# Patient Record
Sex: MALE | Race: WHITE
[De-identification: names, ages, dates, MRNs, and addresses within clinical notes are randomized per-mention and may not be internally consistent; named-entity substitution may affect disease eponyms.]

---

## 2020-10-27 ENCOUNTER — HOSPITAL ENCOUNTER (OUTPATIENT)
Dept: HOSPITAL 22 - PT | Age: 65
Discharge: HOME | End: 2020-10-27
Payer: COMMERCIAL

## 2020-10-27 DIAGNOSIS — M54.41: Primary | ICD-10-CM

## 2020-10-27 PROCEDURE — 97163 PT EVAL HIGH COMPLEX 45 MIN: CPT

## 2020-10-27 PROCEDURE — 97012 MECHANICAL TRACTION THERAPY: CPT

## 2020-10-27 PROCEDURE — 97110 THERAPEUTIC EXERCISES: CPT

## 2020-10-27 PROCEDURE — 97014 ELECTRIC STIMULATION THERAPY: CPT

## 2020-10-27 PROCEDURE — 97010 HOT OR COLD PACKS THERAPY: CPT

## 2020-10-27 PROCEDURE — G0283 ELEC STIM OTHER THAN WOUND: HCPCS

## 2020-11-07 ENCOUNTER — HOSPITAL ENCOUNTER (EMERGENCY)
Age: 65
Discharge: ANOTHER HOSPITAL | End: 2020-11-07
Payer: COMMERCIAL

## 2020-11-07 VITALS
OXYGEN SATURATION: 98 % | DIASTOLIC BLOOD PRESSURE: 74 MMHG | TEMPERATURE: 98.9 F | RESPIRATION RATE: 16 BRPM | HEART RATE: 79 BPM | SYSTOLIC BLOOD PRESSURE: 145 MMHG

## 2020-11-07 VITALS
SYSTOLIC BLOOD PRESSURE: 151 MMHG | OXYGEN SATURATION: 97 % | RESPIRATION RATE: 16 BRPM | HEART RATE: 57 BPM | DIASTOLIC BLOOD PRESSURE: 66 MMHG

## 2020-11-07 VITALS
TEMPERATURE: 99.3 F | SYSTOLIC BLOOD PRESSURE: 194 MMHG | DIASTOLIC BLOOD PRESSURE: 83 MMHG | HEART RATE: 79 BPM | RESPIRATION RATE: 17 BRPM | OXYGEN SATURATION: 97 %

## 2020-11-07 VITALS
RESPIRATION RATE: 18 BRPM | DIASTOLIC BLOOD PRESSURE: 72 MMHG | HEART RATE: 67 BPM | SYSTOLIC BLOOD PRESSURE: 168 MMHG | OXYGEN SATURATION: 95 %

## 2020-11-07 VITALS
RESPIRATION RATE: 18 BRPM | OXYGEN SATURATION: 98 % | SYSTOLIC BLOOD PRESSURE: 155 MMHG | HEART RATE: 55 BPM | DIASTOLIC BLOOD PRESSURE: 60 MMHG

## 2020-11-07 VITALS
HEART RATE: 62 BPM | DIASTOLIC BLOOD PRESSURE: 68 MMHG | SYSTOLIC BLOOD PRESSURE: 153 MMHG | OXYGEN SATURATION: 98 % | RESPIRATION RATE: 18 BRPM

## 2020-11-07 VITALS
RESPIRATION RATE: 20 BRPM | SYSTOLIC BLOOD PRESSURE: 143 MMHG | DIASTOLIC BLOOD PRESSURE: 45 MMHG | OXYGEN SATURATION: 99 % | HEART RATE: 56 BPM

## 2020-11-07 VITALS — BODY MASS INDEX: 26.6 KG/M2

## 2020-11-07 DIAGNOSIS — M51.36: ICD-10-CM

## 2020-11-07 DIAGNOSIS — D49.2: Primary | ICD-10-CM

## 2020-11-07 DIAGNOSIS — M54.31: ICD-10-CM

## 2020-11-07 DIAGNOSIS — Z87.891: ICD-10-CM

## 2020-11-07 PROCEDURE — 96372 THER/PROPH/DIAG INJ SC/IM: CPT

## 2020-11-07 PROCEDURE — 93971 EXTREMITY STUDY: CPT

## 2020-11-07 PROCEDURE — 96376 TX/PRO/DX INJ SAME DRUG ADON: CPT

## 2020-11-07 PROCEDURE — 99284 EMERGENCY DEPT VISIT MOD MDM: CPT

## 2020-11-07 PROCEDURE — 96374 THER/PROPH/DIAG INJ IV PUSH: CPT

## 2020-11-07 PROCEDURE — 72131 CT LUMBAR SPINE W/O DYE: CPT

## 2020-11-07 PROCEDURE — 96367 TX/PROPH/DG ADDL SEQ IV INF: CPT

## 2021-06-08 ENCOUNTER — HOSPITAL ENCOUNTER (OUTPATIENT)
Age: 66
End: 2021-06-08
Payer: MEDICARE

## 2021-06-08 DIAGNOSIS — Z79.899: ICD-10-CM

## 2021-06-08 DIAGNOSIS — M06.00: Primary | ICD-10-CM

## 2021-06-08 LAB
ALBUMIN LEVEL: 4.3 G/DL (ref 3.5–5)
ALBUMIN/GLOB SERPL: 1.5 {RATIO} (ref 1.1–1.8)
ALP ISO SERPL-ACNC: 178 U/L (ref 38–126)
ALT SERPLBLD-CCNC: 22 U/L (ref 12–78)
ANION GAP SERPL CALC-SCNC: 15.4 MEQ/L (ref 5–15)
AST SERPL QL: 31 U/L (ref 17–59)
BILIRUBIN,TOTAL: 0.3 MG/DL (ref 0.2–1.3)
BUN SERPL-MCNC: 21 MG/DL (ref 9–20)
CALCIUM SPEC-MCNC: 9.5 MG/DL (ref 8.4–10.2)
CHLORIDE SPEC-SCNC: 101 MMOL/L (ref 98–107)
CO2 SERPL-SCNC: 25 MMOL/L (ref 22–30)
CREAT BLD-SCNC: 0.9 MG/DL (ref 0.66–1.25)
ESTIMATED GLOMERULAR FILT RATE: 85 ML/MIN (ref 60–?)
GFR (AFRICAN AMERICAN): 102 ML/MIN (ref 60–?)
GLOBULIN SER CALC-MCNC: 2.9 G/DL (ref 1.3–3.2)
GLUCOSE: 90 MG/DL (ref 74–100)
HCT VFR BLD CALC: 38.6 % (ref 42–52)
HGB BLD-MCNC: 12.9 G/DL (ref 14.1–18)
MCHC RBC-ENTMCNC: 33.3 G/DL (ref 31.8–35.4)
MCV RBC: 83.3 FL (ref 80–94)
MEAN CORPUSCULAR HEMOGLOBIN: 27.7 PG (ref 27–31.2)
PLATELET # BLD: 325 K/MM3 (ref 142–424)
POTASSIUM: 5.4 MMOL/L (ref 3.5–5.1)
PROT SERPL-MCNC: 7.2 G/DL (ref 6.3–8.2)
RBC # BLD AUTO: 4.64 M/MM3 (ref 4.6–6.2)
SODIUM SPEC-SCNC: 136 MMOL/L (ref 136–145)
WBC # BLD AUTO: 8.1 K/MM3 (ref 4.8–10.8)

## 2021-06-08 PROCEDURE — 36415 COLL VENOUS BLD VENIPUNCTURE: CPT

## 2021-06-08 PROCEDURE — 80053 COMPREHEN METABOLIC PANEL: CPT

## 2021-06-08 PROCEDURE — 85025 COMPLETE CBC W/AUTO DIFF WBC: CPT

## 2022-01-05 ENCOUNTER — HOSPITAL ENCOUNTER (OUTPATIENT)
Age: 67
End: 2022-01-05
Payer: MEDICARE

## 2022-01-05 DIAGNOSIS — Z79.899: ICD-10-CM

## 2022-01-05 DIAGNOSIS — M10.9: ICD-10-CM

## 2022-01-05 DIAGNOSIS — M15.9: ICD-10-CM

## 2022-01-05 DIAGNOSIS — M06.00: ICD-10-CM

## 2022-01-05 DIAGNOSIS — E78.5: ICD-10-CM

## 2022-01-05 DIAGNOSIS — Z00.00: Primary | ICD-10-CM

## 2022-01-05 LAB
ALBUMIN LEVEL: 4.3 G/DL (ref 3.5–5)
ALBUMIN/GLOB SERPL: 1.7 {RATIO} (ref 1.1–1.8)
ALP ISO SERPL-ACNC: 117 U/L (ref 38–126)
ALT SERPLBLD-CCNC: 23 U/L (ref 12–78)
ANION GAP SERPL CALC-SCNC: 9.9 MEQ/L (ref 5–15)
AST SERPL QL: 32 U/L (ref 17–59)
BILIRUBIN,TOTAL: 0.4 MG/DL (ref 0.2–1.3)
BUN SERPL-MCNC: 17 MG/DL (ref 9–20)
CALCIUM SPEC-MCNC: 9.5 MG/DL (ref 8.4–10.2)
CHLORIDE SPEC-SCNC: 99 MMOL/L (ref 98–107)
CHOLEST SPEC-SCNC: 167 MG/DL (ref 140–200)
CO2 SERPL-SCNC: 31 MMOL/L (ref 22–30)
CREAT BLD-SCNC: 0.9 MG/DL (ref 0.66–1.25)
CRP SERPL HS-MCNC: 2.8 MG/L (ref 0–4)
ESTIMATED GLOMERULAR FILT RATE: 85 ML/MIN (ref 60–?)
GFR (AFRICAN AMERICAN): 102 ML/MIN (ref 60–?)
GLOBULIN SER CALC-MCNC: 2.5 G/DL (ref 1.3–3.2)
GLUCOSE: 97 MG/DL (ref 74–100)
HCT VFR BLD CALC: 42.8 % (ref 42–52)
HDLC SERPL-MCNC: 39 MG/DL (ref 40–60)
HGB BLD-MCNC: 13.7 G/DL (ref 14.1–18)
MCHC RBC-ENTMCNC: 32.1 G/DL (ref 31.8–35.4)
MCV RBC: 91.8 FL (ref 80–94)
MEAN CORPUSCULAR HEMOGLOBIN: 29.5 PG (ref 27–31.2)
PLATELET # BLD: 331 K/MM3 (ref 142–424)
POTASSIUM: 4.9 MMOL/L (ref 3.5–5.1)
PROT SERPL-MCNC: 6.8 G/DL (ref 6.3–8.2)
RBC # BLD AUTO: 4.66 M/MM3 (ref 4.6–6.2)
SODIUM SPEC-SCNC: 135 MMOL/L (ref 136–145)
TRIGLYCERIDES: 174 MG/DL (ref 30–150)
URATE SERPL-SCNC: 6.6 MG/DL (ref 3.5–8.5)
WBC # BLD AUTO: 5.6 K/MM3 (ref 4.8–10.8)

## 2022-01-05 PROCEDURE — 84550 ASSAY OF BLOOD/URIC ACID: CPT

## 2022-01-05 PROCEDURE — 80061 LIPID PANEL: CPT

## 2022-01-05 PROCEDURE — 36415 COLL VENOUS BLD VENIPUNCTURE: CPT

## 2022-01-05 PROCEDURE — 80053 COMPREHEN METABOLIC PANEL: CPT

## 2022-01-05 PROCEDURE — 85651 RBC SED RATE NONAUTOMATED: CPT

## 2022-01-05 PROCEDURE — 85025 COMPLETE CBC W/AUTO DIFF WBC: CPT

## 2022-01-05 PROCEDURE — 86140 C-REACTIVE PROTEIN: CPT

## 2023-02-11 ENCOUNTER — HOSPITAL ENCOUNTER (OUTPATIENT)
Dept: HOSPITAL 22 - ER | Age: 68
Setting detail: OBSERVATION
LOS: 2 days | Discharge: HOME | End: 2023-02-13
Payer: MEDICARE

## 2023-02-11 VITALS
SYSTOLIC BLOOD PRESSURE: 128 MMHG | HEART RATE: 75 BPM | RESPIRATION RATE: 22 BRPM | OXYGEN SATURATION: 96 % | DIASTOLIC BLOOD PRESSURE: 51 MMHG

## 2023-02-11 VITALS
DIASTOLIC BLOOD PRESSURE: 65 MMHG | RESPIRATION RATE: 12 BRPM | SYSTOLIC BLOOD PRESSURE: 129 MMHG | OXYGEN SATURATION: 99 % | HEART RATE: 71 BPM

## 2023-02-11 VITALS
RESPIRATION RATE: 15 BRPM | OXYGEN SATURATION: 99 % | DIASTOLIC BLOOD PRESSURE: 63 MMHG | SYSTOLIC BLOOD PRESSURE: 155 MMHG | HEART RATE: 67 BPM

## 2023-02-11 VITALS
RESPIRATION RATE: 18 BRPM | OXYGEN SATURATION: 95 % | HEART RATE: 69 BPM | DIASTOLIC BLOOD PRESSURE: 65 MMHG | TEMPERATURE: 98.6 F | SYSTOLIC BLOOD PRESSURE: 148 MMHG

## 2023-02-11 VITALS
SYSTOLIC BLOOD PRESSURE: 146 MMHG | HEART RATE: 85 BPM | DIASTOLIC BLOOD PRESSURE: 75 MMHG | OXYGEN SATURATION: 99 % | TEMPERATURE: 98.24 F | RESPIRATION RATE: 18 BRPM

## 2023-02-11 VITALS
SYSTOLIC BLOOD PRESSURE: 142 MMHG | RESPIRATION RATE: 18 BRPM | DIASTOLIC BLOOD PRESSURE: 51 MMHG | OXYGEN SATURATION: 99 % | HEART RATE: 82 BPM

## 2023-02-11 VITALS
DIASTOLIC BLOOD PRESSURE: 59 MMHG | RESPIRATION RATE: 16 BRPM | OXYGEN SATURATION: 97 % | HEART RATE: 61 BPM | SYSTOLIC BLOOD PRESSURE: 135 MMHG

## 2023-02-11 VITALS
RESPIRATION RATE: 15 BRPM | TEMPERATURE: 97.9 F | SYSTOLIC BLOOD PRESSURE: 155 MMHG | DIASTOLIC BLOOD PRESSURE: 63 MMHG | OXYGEN SATURATION: 99 % | HEART RATE: 67 BPM

## 2023-02-11 VITALS
OXYGEN SATURATION: 98 % | RESPIRATION RATE: 18 BRPM | DIASTOLIC BLOOD PRESSURE: 56 MMHG | SYSTOLIC BLOOD PRESSURE: 147 MMHG | HEART RATE: 78 BPM

## 2023-02-11 VITALS
RESPIRATION RATE: 15 BRPM | HEART RATE: 75 BPM | DIASTOLIC BLOOD PRESSURE: 70 MMHG | OXYGEN SATURATION: 98 % | SYSTOLIC BLOOD PRESSURE: 150 MMHG

## 2023-02-11 VITALS
SYSTOLIC BLOOD PRESSURE: 123 MMHG | OXYGEN SATURATION: 97 % | DIASTOLIC BLOOD PRESSURE: 80 MMHG | RESPIRATION RATE: 18 BRPM

## 2023-02-11 VITALS
DIASTOLIC BLOOD PRESSURE: 54 MMHG | OXYGEN SATURATION: 99 % | SYSTOLIC BLOOD PRESSURE: 129 MMHG | RESPIRATION RATE: 12 BRPM | HEART RATE: 74 BPM

## 2023-02-11 VITALS
DIASTOLIC BLOOD PRESSURE: 60 MMHG | HEART RATE: 63 BPM | OXYGEN SATURATION: 96 % | SYSTOLIC BLOOD PRESSURE: 121 MMHG | RESPIRATION RATE: 17 BRPM

## 2023-02-11 VITALS
SYSTOLIC BLOOD PRESSURE: 129 MMHG | DIASTOLIC BLOOD PRESSURE: 65 MMHG | OXYGEN SATURATION: 96 % | TEMPERATURE: 98.2 F | HEART RATE: 84 BPM | RESPIRATION RATE: 18 BRPM

## 2023-02-11 VITALS
SYSTOLIC BLOOD PRESSURE: 124 MMHG | HEART RATE: 78 BPM | OXYGEN SATURATION: 99 % | DIASTOLIC BLOOD PRESSURE: 52 MMHG | RESPIRATION RATE: 15 BRPM

## 2023-02-11 VITALS — BODY MASS INDEX: 25 KG/M2 | BODY MASS INDEX: 27.4 KG/M2 | BODY MASS INDEX: 24.6 KG/M2

## 2023-02-11 VITALS
SYSTOLIC BLOOD PRESSURE: 133 MMHG | OXYGEN SATURATION: 99 % | RESPIRATION RATE: 21 BRPM | DIASTOLIC BLOOD PRESSURE: 52 MMHG | HEART RATE: 75 BPM

## 2023-02-11 VITALS
SYSTOLIC BLOOD PRESSURE: 144 MMHG | OXYGEN SATURATION: 98 % | DIASTOLIC BLOOD PRESSURE: 60 MMHG | TEMPERATURE: 97.8 F | RESPIRATION RATE: 18 BRPM | HEART RATE: 72 BPM

## 2023-02-11 VITALS
OXYGEN SATURATION: 98 % | RESPIRATION RATE: 14 BRPM | HEART RATE: 82 BPM | SYSTOLIC BLOOD PRESSURE: 142 MMHG | DIASTOLIC BLOOD PRESSURE: 57 MMHG

## 2023-02-11 DIAGNOSIS — R11.2: Primary | ICD-10-CM

## 2023-02-11 DIAGNOSIS — Z20.822: ICD-10-CM

## 2023-02-11 DIAGNOSIS — M54.9: ICD-10-CM

## 2023-02-11 DIAGNOSIS — Z79.899: ICD-10-CM

## 2023-02-11 DIAGNOSIS — M51.17: ICD-10-CM

## 2023-02-11 DIAGNOSIS — U07.1: ICD-10-CM

## 2023-02-11 DIAGNOSIS — M51.36: ICD-10-CM

## 2023-02-11 DIAGNOSIS — I10: ICD-10-CM

## 2023-02-11 LAB
ALBUMIN LEVEL: 4.7 G/DL (ref 3.5–5)
ALBUMIN/GLOB SERPL: 1.4 {RATIO} (ref 1.1–1.8)
ALP ISO SERPL-ACNC: 143 U/L (ref 38–126)
ALT SERPLBLD-CCNC: 29 U/L (ref 12–78)
ANION GAP SERPL CALC-SCNC: 15.2 MEQ/L (ref 5–15)
AST SERPL QL: 33 U/L (ref 17–59)
BILIRUBIN,TOTAL: 0.7 MG/DL (ref 0.2–1.3)
BUN SERPL-MCNC: 22 MG/DL (ref 9–20)
CALCIUM SPEC-MCNC: 9.9 MG/DL (ref 8.4–10.2)
CELLS COUNTED: 100
CHLORIDE SPEC-SCNC: 101 MMOL/L (ref 98–107)
CO2 SERPL-SCNC: 30 MMOL/L (ref 22–30)
CREAT BLD-SCNC: 1 MG/DL (ref 0.66–1.25)
CREATININE CLEARANCE ESTIMATED: 77 ML/MIN (ref 50–200)
ESTIMATED GLOMERULAR FILT RATE: 75 ML/MIN (ref 60–?)
GFR (AFRICAN AMERICAN): 90 ML/MIN (ref 60–?)
GLOBULIN SER CALC-MCNC: 3.3 G/DL (ref 1.3–3.2)
GLUCOSE: 175 MG/DL (ref 74–100)
HCT VFR BLD CALC: 46.5 % (ref 42–52)
HGB BLD-MCNC: 15.6 G/DL (ref 14.1–18)
MANUAL DIFFERENTIAL: (no result)
MCHC RBC-ENTMCNC: 33.6 G/DL (ref 31.8–35.4)
MCV RBC: 89.4 FL (ref 80–94)
MEAN CORPUSCULAR HEMOGLOBIN: 30 PG (ref 27–31.2)
PLATELET # BLD: 392 K/MM3 (ref 142–424)
POTASSIUM: 4.2 MMOL/L (ref 3.5–5.1)
PROT SERPL-MCNC: 8 G/DL (ref 6.3–8.2)
RBC # BLD AUTO: 5.2 M/MM3 (ref 4.6–6.2)
SODIUM SPEC-SCNC: 142 MMOL/L (ref 136–145)
WBC # BLD AUTO: 7.9 K/MM3 (ref 4.8–10.8)

## 2023-02-11 PROCEDURE — 99285 EMERGENCY DEPT VISIT HI MDM: CPT

## 2023-02-11 PROCEDURE — 80048 BASIC METABOLIC PNL TOTAL CA: CPT

## 2023-02-11 PROCEDURE — 36415 COLL VENOUS BLD VENIPUNCTURE: CPT

## 2023-02-11 PROCEDURE — U0005 INFEC AGEN DETEC AMPLI PROBE: HCPCS

## 2023-02-11 PROCEDURE — 85025 COMPLETE CBC W/AUTO DIFF WBC: CPT

## 2023-02-11 PROCEDURE — 83735 ASSAY OF MAGNESIUM: CPT

## 2023-02-11 PROCEDURE — 87205 SMEAR GRAM STAIN: CPT

## 2023-02-11 PROCEDURE — 87507 IADNA-DNA/RNA PROBE TQ 12-25: CPT

## 2023-02-11 PROCEDURE — C9803 HOPD COVID-19 SPEC COLLECT: HCPCS

## 2023-02-11 PROCEDURE — 85007 BL SMEAR W/DIFF WBC COUNT: CPT

## 2023-02-11 PROCEDURE — G0378 HOSPITAL OBSERVATION PER HR: HCPCS

## 2023-02-11 PROCEDURE — 80053 COMPREHEN METABOLIC PANEL: CPT

## 2023-02-11 PROCEDURE — U0003 INFECTIOUS AGENT DETECTION BY NUCLEIC ACID (DNA OR RNA); SEVERE ACUTE RESPIRATORY SYNDROME CORONAVIRUS 2 (SARS-COV-2) (CORONAVIRUS DISEASE [COVID-19]), AMPLIFIED PROBE TECHNIQUE, MAKING USE OF HIGH THROUGHPUT TECHNOLOGIES AS DESCRIBED BY CMS-2020-01-R: HCPCS

## 2023-02-12 VITALS
RESPIRATION RATE: 16 BRPM | HEART RATE: 67 BPM | DIASTOLIC BLOOD PRESSURE: 47 MMHG | OXYGEN SATURATION: 97 % | TEMPERATURE: 98.4 F | SYSTOLIC BLOOD PRESSURE: 153 MMHG

## 2023-02-12 VITALS
TEMPERATURE: 98.5 F | SYSTOLIC BLOOD PRESSURE: 138 MMHG | OXYGEN SATURATION: 95 % | HEART RATE: 70 BPM | RESPIRATION RATE: 17 BRPM | DIASTOLIC BLOOD PRESSURE: 53 MMHG

## 2023-02-12 VITALS
OXYGEN SATURATION: 98 % | SYSTOLIC BLOOD PRESSURE: 140 MMHG | DIASTOLIC BLOOD PRESSURE: 55 MMHG | HEART RATE: 77 BPM | TEMPERATURE: 98.5 F | RESPIRATION RATE: 16 BRPM

## 2023-02-12 VITALS
HEART RATE: 64 BPM | RESPIRATION RATE: 17 BRPM | TEMPERATURE: 98.4 F | OXYGEN SATURATION: 97 % | SYSTOLIC BLOOD PRESSURE: 140 MMHG | DIASTOLIC BLOOD PRESSURE: 55 MMHG

## 2023-02-12 VITALS
RESPIRATION RATE: 16 BRPM | DIASTOLIC BLOOD PRESSURE: 53 MMHG | SYSTOLIC BLOOD PRESSURE: 159 MMHG | OXYGEN SATURATION: 95 % | HEART RATE: 59 BPM | TEMPERATURE: 99.32 F

## 2023-02-12 VITALS
RESPIRATION RATE: 17 BRPM | HEART RATE: 65 BPM | TEMPERATURE: 98.42 F | SYSTOLIC BLOOD PRESSURE: 164 MMHG | DIASTOLIC BLOOD PRESSURE: 56 MMHG | OXYGEN SATURATION: 99 %

## 2023-02-12 VITALS
RESPIRATION RATE: 18 BRPM | HEART RATE: 68 BPM | SYSTOLIC BLOOD PRESSURE: 132 MMHG | OXYGEN SATURATION: 100 % | DIASTOLIC BLOOD PRESSURE: 58 MMHG | TEMPERATURE: 98.6 F

## 2023-02-12 LAB
ALBUMIN LEVEL: 3.6 G/DL (ref 3.5–5)
ALBUMIN/GLOB SERPL: 1.4 {RATIO} (ref 1.1–1.8)
ALP ISO SERPL-ACNC: 101 U/L (ref 38–126)
ALT SERPLBLD-CCNC: 20 U/L (ref 12–78)
ANION GAP SERPL CALC-SCNC: 10.8 MEQ/L (ref 5–15)
ANION GAP SERPL CALC-SCNC: 6.8 MEQ/L (ref 5–15)
AST SERPL QL: 30 U/L (ref 17–59)
BILIRUBIN,TOTAL: 0.3 MG/DL (ref 0.2–1.3)
BUN SERPL-MCNC: 20 MG/DL (ref 9–20)
BUN SERPL-MCNC: 20 MG/DL (ref 9–20)
CALCIUM SPEC-MCNC: 8.4 MG/DL (ref 8.4–10.2)
CALCIUM SPEC-MCNC: 8.5 MG/DL (ref 8.4–10.2)
CHLORIDE SPEC-SCNC: 106 MMOL/L (ref 98–107)
CHLORIDE SPEC-SCNC: 108 MMOL/L (ref 98–107)
CO2 SERPL-SCNC: 30 MMOL/L (ref 22–30)
CO2 SERPL-SCNC: 31 MMOL/L (ref 22–30)
CREAT BLD-SCNC: 0.9 MG/DL (ref 0.66–1.25)
CREAT BLD-SCNC: 0.9 MG/DL (ref 0.66–1.25)
CREATININE CLEARANCE ESTIMATED: 69 ML/MIN (ref 50–200)
CREATININE CLEARANCE ESTIMATED: 69 ML/MIN (ref 50–200)
ESTIMATED GLOMERULAR FILT RATE: 84 ML/MIN (ref 60–?)
ESTIMATED GLOMERULAR FILT RATE: 84 ML/MIN (ref 60–?)
GFR (AFRICAN AMERICAN): 102 ML/MIN (ref 60–?)
GFR (AFRICAN AMERICAN): 102 ML/MIN (ref 60–?)
GLOBULIN SER CALC-MCNC: 2.6 G/DL (ref 1.3–3.2)
GLUCOSE: 91 MG/DL (ref 74–100)
GLUCOSE: 94 MG/DL (ref 74–100)
MAGNESIUM: 1.7 MG/DL (ref 1.6–2.3)
POTASSIUM: 3.8 MMOL/L (ref 3.5–5.1)
POTASSIUM: 3.8 MMOL/L (ref 3.5–5.1)
PROT SERPL-MCNC: 6.2 G/DL (ref 6.3–8.2)
SODIUM SPEC-SCNC: 142 MMOL/L (ref 136–145)
SODIUM SPEC-SCNC: 143 MMOL/L (ref 136–145)

## 2023-02-13 VITALS
TEMPERATURE: 98.8 F | SYSTOLIC BLOOD PRESSURE: 142 MMHG | OXYGEN SATURATION: 97 % | DIASTOLIC BLOOD PRESSURE: 63 MMHG | HEART RATE: 85 BPM | RESPIRATION RATE: 16 BRPM

## 2023-02-13 VITALS
DIASTOLIC BLOOD PRESSURE: 56 MMHG | SYSTOLIC BLOOD PRESSURE: 131 MMHG | OXYGEN SATURATION: 98 % | RESPIRATION RATE: 19 BRPM | HEART RATE: 86 BPM

## 2023-02-13 LAB
ALBUMIN LEVEL: 3.3 G/DL (ref 3.5–5)
ALBUMIN/GLOB SERPL: 1.3 {RATIO} (ref 1.1–1.8)
ALP ISO SERPL-ACNC: 97 U/L (ref 38–126)
ALT SERPLBLD-CCNC: 18 U/L (ref 12–78)
ANION GAP SERPL CALC-SCNC: 8.5 MEQ/L (ref 5–15)
AST SERPL QL: 33 U/L (ref 17–59)
BILIRUBIN,TOTAL: 0.5 MG/DL (ref 0.2–1.3)
BUN SERPL-MCNC: 13 MG/DL (ref 9–20)
CALCIUM SPEC-MCNC: 8.5 MG/DL (ref 8.4–10.2)
CHLORIDE SPEC-SCNC: 104 MMOL/L (ref 98–107)
CO2 SERPL-SCNC: 27 MMOL/L (ref 22–30)
CREAT BLD-SCNC: 0.6 MG/DL (ref 0.66–1.25)
CREATININE CLEARANCE ESTIMATED: 70 ML/MIN (ref 50–200)
ESTIMATED GLOMERULAR FILT RATE: 135 ML/MIN (ref 60–?)
GFR (AFRICAN AMERICAN): 163 ML/MIN (ref 60–?)
GLOBULIN SER CALC-MCNC: 2.5 G/DL (ref 1.3–3.2)
GLUCOSE: 87 MG/DL (ref 74–100)
HCT VFR BLD CALC: 40 % (ref 42–52)
HGB BLD-MCNC: 13.3 G/DL (ref 14.1–18)
MCHC RBC-ENTMCNC: 33.2 G/DL (ref 31.8–35.4)
MCV RBC: 89 FL (ref 80–94)
MEAN CORPUSCULAR HEMOGLOBIN: 29.5 PG (ref 27–31.2)
PLATELET # BLD: 420 K/MM3 (ref 142–424)
POTASSIUM: 3.5 MMOL/L (ref 3.5–5.1)
PROT SERPL-MCNC: 5.8 G/DL (ref 6.3–8.2)
RBC # BLD AUTO: 4.49 M/MM3 (ref 4.6–6.2)
SODIUM SPEC-SCNC: 136 MMOL/L (ref 136–145)
WBC # BLD AUTO: 8.8 K/MM3 (ref 4.8–10.8)

## 2023-04-20 ENCOUNTER — HOSPITAL ENCOUNTER (OUTPATIENT)
Age: 68
End: 2023-04-20
Payer: MEDICARE

## 2023-04-20 DIAGNOSIS — M25.511: Primary | ICD-10-CM

## 2023-04-20 PROCEDURE — 73030 X-RAY EXAM OF SHOULDER: CPT

## 2023-12-20 ENCOUNTER — HOSPITAL ENCOUNTER (EMERGENCY)
Age: 68
Discharge: HOME | End: 2023-12-20
Payer: MEDICARE

## 2023-12-20 VITALS
TEMPERATURE: 98.5 F | DIASTOLIC BLOOD PRESSURE: 55 MMHG | HEART RATE: 69 BPM | RESPIRATION RATE: 17 BRPM | OXYGEN SATURATION: 99 % | SYSTOLIC BLOOD PRESSURE: 126 MMHG

## 2023-12-20 VITALS — DIASTOLIC BLOOD PRESSURE: 53 MMHG | SYSTOLIC BLOOD PRESSURE: 127 MMHG | OXYGEN SATURATION: 98 % | HEART RATE: 63 BPM

## 2023-12-20 VITALS
OXYGEN SATURATION: 99 % | DIASTOLIC BLOOD PRESSURE: 79 MMHG | TEMPERATURE: 98.5 F | SYSTOLIC BLOOD PRESSURE: 164 MMHG | RESPIRATION RATE: 18 BRPM | HEART RATE: 90 BPM

## 2023-12-20 VITALS — HEART RATE: 63 BPM | OXYGEN SATURATION: 98 % | DIASTOLIC BLOOD PRESSURE: 73 MMHG | SYSTOLIC BLOOD PRESSURE: 122 MMHG

## 2023-12-20 VITALS — BODY MASS INDEX: 27.4 KG/M2

## 2023-12-20 DIAGNOSIS — I48.91: Primary | ICD-10-CM

## 2023-12-20 DIAGNOSIS — I65.23: ICD-10-CM

## 2023-12-20 DIAGNOSIS — Z86.718: ICD-10-CM

## 2023-12-20 DIAGNOSIS — E78.5: ICD-10-CM

## 2023-12-20 DIAGNOSIS — R47.89: ICD-10-CM

## 2023-12-20 DIAGNOSIS — Z87.891: ICD-10-CM

## 2023-12-20 DIAGNOSIS — Z86.73: ICD-10-CM

## 2023-12-20 DIAGNOSIS — I10: ICD-10-CM

## 2023-12-20 LAB
ALBUMIN LEVEL: 4.6 G/DL (ref 3.5–5)
ALBUMIN/GLOB SERPL: 1.7 {RATIO} (ref 1.1–1.8)
ALP ISO SERPL-ACNC: 136 U/L (ref 38–126)
ALT SERPLBLD-CCNC: 31 U/L (ref 12–78)
ANION GAP SERPL CALC-SCNC: 13.9 MEQ/L (ref 5–15)
AST SERPL QL: 35 U/L (ref 17–59)
BILIRUBIN,TOTAL: 0.6 MG/DL (ref 0.2–1.3)
BUN SERPL-MCNC: 12 MG/DL (ref 9–20)
CALCIUM SPEC-MCNC: 9.5 MG/DL (ref 8.4–10.2)
CHLORIDE SPEC-SCNC: 100 MMOL/L (ref 98–107)
CO2 SERPL-SCNC: 29 MMOL/L (ref 22–30)
CREAT BLD-SCNC: 1 MG/DL (ref 0.66–1.25)
CREATININE CLEARANCE ESTIMATED: 74 ML/MIN (ref 50–200)
ESTIMATED GLOMERULAR FILT RATE: 75 ML/MIN (ref 60–?)
GFR (AFRICAN AMERICAN): 90 ML/MIN (ref 60–?)
GLOBULIN SER CALC-MCNC: 2.7 G/DL (ref 1.3–3.2)
GLUCOSE BLDC GLUCOMTR-MCNC: 125 MG/DL (ref 70–110)
GLUCOSE: 110 MG/DL (ref 74–100)
HCT VFR BLD CALC: 42.3 % (ref 42–52)
HGB BLD-MCNC: 14.3 G/DL (ref 14.1–18)
MCHC RBC-ENTMCNC: 33.8 G/DL (ref 31.8–35.4)
MCV RBC: 91.7 FL (ref 80–94)
MEAN CORPUSCULAR HEMOGLOBIN: 31.1 PG (ref 27–31.2)
PLATELET # BLD: 311 K/MM3 (ref 142–424)
POTASSIUM: 3.9 MMOL/L (ref 3.5–5.1)
PROT SERPL-MCNC: 7.3 G/DL (ref 6.3–8.2)
RBC # BLD AUTO: 4.61 M/MM3 (ref 4.6–6.2)
SODIUM SPEC-SCNC: 139 MMOL/L (ref 136–145)
WBC # BLD AUTO: 5.8 K/MM3 (ref 4.8–10.8)

## 2023-12-20 PROCEDURE — 80053 COMPREHEN METABOLIC PANEL: CPT

## 2023-12-20 PROCEDURE — 82962 GLUCOSE BLOOD TEST: CPT

## 2023-12-20 PROCEDURE — 70496 CT ANGIOGRAPHY HEAD: CPT

## 2023-12-20 PROCEDURE — 96360 HYDRATION IV INFUSION INIT: CPT

## 2023-12-20 PROCEDURE — 70498 CT ANGIOGRAPHY NECK: CPT

## 2023-12-20 PROCEDURE — 71045 X-RAY EXAM CHEST 1 VIEW: CPT

## 2023-12-20 PROCEDURE — 85025 COMPLETE CBC W/AUTO DIFF WBC: CPT

## 2023-12-20 PROCEDURE — 93005 ELECTROCARDIOGRAM TRACING: CPT

## 2023-12-20 PROCEDURE — 99285 EMERGENCY DEPT VISIT HI MDM: CPT

## 2023-12-20 PROCEDURE — 70450 CT HEAD/BRAIN W/O DYE: CPT

## 2024-01-02 ENCOUNTER — HOSPITAL ENCOUNTER (OUTPATIENT)
Age: 69
Discharge: HOME | End: 2024-01-02
Payer: MEDICARE

## 2024-01-02 VITALS
SYSTOLIC BLOOD PRESSURE: 98 MMHG | RESPIRATION RATE: 16 BRPM | OXYGEN SATURATION: 97 % | HEART RATE: 53 BPM | DIASTOLIC BLOOD PRESSURE: 53 MMHG

## 2024-01-02 VITALS
OXYGEN SATURATION: 95 % | RESPIRATION RATE: 15 BRPM | HEART RATE: 56 BPM | SYSTOLIC BLOOD PRESSURE: 89 MMHG | TEMPERATURE: 97.2 F | DIASTOLIC BLOOD PRESSURE: 43 MMHG

## 2024-01-02 VITALS
HEART RATE: 52 BPM | RESPIRATION RATE: 16 BRPM | OXYGEN SATURATION: 97 % | SYSTOLIC BLOOD PRESSURE: 98 MMHG | DIASTOLIC BLOOD PRESSURE: 40 MMHG

## 2024-01-02 VITALS
SYSTOLIC BLOOD PRESSURE: 98 MMHG | DIASTOLIC BLOOD PRESSURE: 50 MMHG | RESPIRATION RATE: 17 BRPM | HEART RATE: 61 BPM | OXYGEN SATURATION: 96 %

## 2024-01-02 VITALS
DIASTOLIC BLOOD PRESSURE: 58 MMHG | RESPIRATION RATE: 16 BRPM | TEMPERATURE: 97.16 F | SYSTOLIC BLOOD PRESSURE: 151 MMHG | HEART RATE: 69 BPM | OXYGEN SATURATION: 99 %

## 2024-01-02 VITALS
RESPIRATION RATE: 16 BRPM | SYSTOLIC BLOOD PRESSURE: 99 MMHG | HEART RATE: 56 BPM | OXYGEN SATURATION: 98 % | TEMPERATURE: 98 F | DIASTOLIC BLOOD PRESSURE: 54 MMHG

## 2024-01-02 VITALS — OXYGEN SATURATION: 99 %

## 2024-01-02 VITALS — BODY MASS INDEX: 27.4 KG/M2

## 2024-01-02 DIAGNOSIS — Z79.01: ICD-10-CM

## 2024-01-02 DIAGNOSIS — R94.31: ICD-10-CM

## 2024-01-02 DIAGNOSIS — Z79.899: ICD-10-CM

## 2024-01-02 DIAGNOSIS — I48.0: Primary | ICD-10-CM

## 2024-01-02 DIAGNOSIS — I65.22: ICD-10-CM

## 2024-01-02 LAB
ANION GAP SERPL CALC-SCNC: 8.5 MEQ/L (ref 5–15)
BUN SERPL-MCNC: 19 MG/DL (ref 9–20)
CALCIUM SPEC-MCNC: 9.1 MG/DL (ref 8.4–10.2)
CHLORIDE SPEC-SCNC: 104 MMOL/L (ref 98–107)
CO2 SERPL-SCNC: 28 MMOL/L (ref 22–30)
CREATININE CLEARANCE ESTIMATED: 67 ML/MIN (ref 50–200)
CREATININE,SERUM: 1.1 MG/DL (ref 0.66–1.25)
ESTIMATED GLOMERULAR FILT RATE: 67 ML/MIN (ref 60–?)
GFR (AFRICAN AMERICAN): 81 ML/MIN (ref 60–?)
GLUCOSE: 97 MG/DL (ref 74–100)
HCT VFR BLD CALC: 44.2 % (ref 42–52)
HGB BLD-MCNC: 14.5 G/DL (ref 14.1–18)
INR PPP: 1.07 (ref 0.9–1.1)
MCHC RBC-ENTMCNC: 32.8 G/DL (ref 31.8–35.4)
MCV RBC: 93.1 FL (ref 80–94)
MEAN CORPUSCULAR HEMOGLOBIN: 30.5 PG (ref 27–31.2)
PLATELET # BLD: 273 K/MM3 (ref 142–424)
POTASSIUM: 4.5 MMOL/L (ref 3.5–5.1)
PT BLD: 11.5 SECONDS (ref 10.1–12.5)
RBC # BLD AUTO: 4.75 M/MM3 (ref 4.6–6.2)
SODIUM SPEC-SCNC: 136 MMOL/L (ref 136–145)
WBC # BLD AUTO: 6.8 K/MM3 (ref 4.8–10.8)

## 2024-01-02 PROCEDURE — 92960 CARDIOVERSION ELECTRIC EXT: CPT

## 2024-01-02 PROCEDURE — 93270 REMOTE 30 DAY ECG REV/REPORT: CPT

## 2024-01-02 PROCEDURE — 93319 3D ECHO IMG CGEN CAR ANOMAL: CPT

## 2024-01-02 PROCEDURE — 85025 COMPLETE CBC W/AUTO DIFF WBC: CPT

## 2024-01-02 PROCEDURE — 80048 BASIC METABOLIC PNL TOTAL CA: CPT

## 2024-01-02 PROCEDURE — 85610 PROTHROMBIN TIME: CPT

## 2024-01-02 PROCEDURE — 93005 ELECTROCARDIOGRAM TRACING: CPT

## 2024-01-02 PROCEDURE — 93312 ECHO TRANSESOPHAGEAL: CPT

## 2024-01-03 ENCOUNTER — HOSPITAL ENCOUNTER (OUTPATIENT)
Dept: HOSPITAL 22 - RAD | Age: 69
End: 2024-01-03
Payer: MEDICARE

## 2024-01-03 DIAGNOSIS — I48.91: Primary | ICD-10-CM

## 2024-01-03 DIAGNOSIS — Z87.891: ICD-10-CM

## 2024-01-03 DIAGNOSIS — R94.31: ICD-10-CM

## 2024-01-03 DIAGNOSIS — I65.29: ICD-10-CM

## 2024-01-03 PROCEDURE — 93306 TTE W/DOPPLER COMPLETE: CPT

## 2024-01-05 ENCOUNTER — HOSPITAL ENCOUNTER (OUTPATIENT)
Dept: HOSPITAL 22 - RAD | Age: 69
End: 2024-01-05
Payer: MEDICARE

## 2024-01-05 DIAGNOSIS — R94.31: ICD-10-CM

## 2024-01-05 DIAGNOSIS — I48.91: Primary | ICD-10-CM

## 2024-01-05 DIAGNOSIS — I65.29: ICD-10-CM

## 2024-01-05 PROCEDURE — 93017 CV STRESS TEST TRACING ONLY: CPT

## 2024-01-05 PROCEDURE — 78452 HT MUSCLE IMAGE SPECT MULT: CPT

## 2024-01-05 PROCEDURE — 93018 CV STRESS TEST I&R ONLY: CPT

## 2024-01-05 PROCEDURE — A9502 TC99M TETROFOSMIN: HCPCS

## 2024-01-05 RX ADMIN — SODIUM CHLORIDE, PRESERVATIVE FREE 10 ML: 5 INJECTION INTRAVENOUS at 08:45

## 2024-01-31 ENCOUNTER — HOSPITAL ENCOUNTER (OUTPATIENT)
Dept: HOSPITAL 22 - RT | Age: 69
End: 2024-01-31
Payer: MEDICARE

## 2024-01-31 DIAGNOSIS — I48.91: ICD-10-CM

## 2024-01-31 DIAGNOSIS — I48.0: Primary | ICD-10-CM

## 2024-01-31 DIAGNOSIS — Z86.718: ICD-10-CM

## 2024-01-31 DIAGNOSIS — M79.604: ICD-10-CM

## 2024-01-31 PROCEDURE — 93971 EXTREMITY STUDY: CPT

## 2024-02-02 ENCOUNTER — HOSPITAL ENCOUNTER (OUTPATIENT)
Dept: HOSPITAL 22 - RT | Age: 69
End: 2024-02-02
Payer: MEDICARE

## 2024-02-02 DIAGNOSIS — I63.9: ICD-10-CM

## 2024-02-02 DIAGNOSIS — I48.91: ICD-10-CM

## 2024-02-02 DIAGNOSIS — R09.89: ICD-10-CM

## 2024-02-02 LAB
ALBUMIN LEVEL: 4.2 G/DL (ref 3.5–5)
ALBUMIN/GLOB SERPL: 1.8 {RATIO} (ref 1.1–1.8)
ALP ISO SERPL-ACNC: 119 U/L (ref 38–126)
ALT SERPLBLD-CCNC: 29 U/L (ref 12–78)
ANION GAP SERPL CALC-SCNC: 9 MEQ/L (ref 5–15)
AST SERPL QL: 35 U/L (ref 17–59)
BILIRUBIN,TOTAL: 0.5 MG/DL (ref 0.2–1.3)
BUN SERPL-MCNC: 16 MG/DL (ref 9–20)
CALCIUM SPEC-MCNC: 9.5 MG/DL (ref 8.4–10.2)
CHLORIDE SPEC-SCNC: 104 MMOL/L (ref 98–107)
CO2 SERPL-SCNC: 30 MMOL/L (ref 22–30)
CREATININE,SERUM: 0.9 MG/DL (ref 0.66–1.25)
ESTIMATED GLOMERULAR FILT RATE: 84 ML/MIN (ref 60–?)
GFR (AFRICAN AMERICAN): 102 ML/MIN (ref 60–?)
GLOBULIN SER CALC-MCNC: 2.4 G/DL (ref 1.3–3.2)
GLUCOSE: 94 MG/DL (ref 74–100)
POTASSIUM: 5 MMOL/L (ref 3.5–5.1)
PROT SERPL-MCNC: 6.6 G/DL (ref 6.3–8.2)
SODIUM SPEC-SCNC: 138 MMOL/L (ref 136–145)

## 2024-02-02 PROCEDURE — 80053 COMPREHEN METABOLIC PANEL: CPT

## 2024-02-02 PROCEDURE — 93880 EXTRACRANIAL BILAT STUDY: CPT

## 2024-02-02 PROCEDURE — 36415 COLL VENOUS BLD VENIPUNCTURE: CPT

## 2024-02-12 ENCOUNTER — HOSPITAL ENCOUNTER (OUTPATIENT)
Dept: HOSPITAL 22 - RAD | Age: 69
End: 2024-02-12
Payer: MEDICARE

## 2024-02-12 DIAGNOSIS — I63.9: Primary | ICD-10-CM

## 2024-04-30 ENCOUNTER — HOSPITAL ENCOUNTER (OUTPATIENT)
Dept: HOSPITAL 22 - RAD | Age: 69
Discharge: HOME | End: 2024-04-30
Payer: MEDICARE

## 2024-04-30 DIAGNOSIS — I63.9: ICD-10-CM

## 2024-04-30 DIAGNOSIS — R41.3: Primary | ICD-10-CM

## 2024-04-30 PROCEDURE — 70551 MRI BRAIN STEM W/O DYE: CPT

## 2024-06-10 ENCOUNTER — HOSPITAL ENCOUNTER (EMERGENCY)
Age: 69
Discharge: HOME | End: 2024-06-10
Payer: MEDICARE

## 2024-06-10 VITALS
OXYGEN SATURATION: 100 % | HEART RATE: 61 BPM | RESPIRATION RATE: 20 BRPM | TEMPERATURE: 98.1 F | DIASTOLIC BLOOD PRESSURE: 64 MMHG | SYSTOLIC BLOOD PRESSURE: 186 MMHG

## 2024-06-10 VITALS
OXYGEN SATURATION: 98 % | RESPIRATION RATE: 16 BRPM | TEMPERATURE: 98.1 F | SYSTOLIC BLOOD PRESSURE: 154 MMHG | HEART RATE: 88 BPM | DIASTOLIC BLOOD PRESSURE: 62 MMHG

## 2024-06-10 VITALS — BODY MASS INDEX: 26.6 KG/M2

## 2024-06-10 DIAGNOSIS — M79.641: ICD-10-CM

## 2024-06-10 DIAGNOSIS — M25.531: ICD-10-CM

## 2024-06-10 DIAGNOSIS — M25.511: Primary | ICD-10-CM

## 2024-06-10 DIAGNOSIS — M06.00: ICD-10-CM

## 2024-06-10 DIAGNOSIS — M19.011: ICD-10-CM

## 2024-06-10 PROCEDURE — 99283 EMERGENCY DEPT VISIT LOW MDM: CPT

## 2024-06-10 PROCEDURE — 73030 X-RAY EXAM OF SHOULDER: CPT

## 2024-06-13 ENCOUNTER — HOSPITAL ENCOUNTER (OUTPATIENT)
Dept: HOSPITAL 22 - RAD | Age: 69
Discharge: HOME | End: 2024-06-13
Payer: MEDICARE

## 2024-06-13 DIAGNOSIS — M25.511: Primary | ICD-10-CM

## 2024-06-13 PROCEDURE — 73221 MRI JOINT UPR EXTREM W/O DYE: CPT

## 2024-09-03 ENCOUNTER — HOSPITAL ENCOUNTER (OUTPATIENT)
Dept: GENERAL RADIOLOGY | Facility: HOSPITAL | Age: 69
Discharge: HOME | End: 2024-09-03
Payer: MEDICARE

## 2024-09-03 ENCOUNTER — PRE-ADMISSION TESTING (OUTPATIENT)
Dept: PREADMISSION TESTING | Facility: HOSPITAL | Age: 69
End: 2024-09-03
Payer: MEDICARE

## 2024-09-03 VITALS — BODY MASS INDEX: 27.79 KG/M2 | HEIGHT: 65 IN | WEIGHT: 166.78 LBS

## 2024-09-03 LAB
ANION GAP SERPL CALCULATED.3IONS-SCNC: 11 MMOL/L (ref 5–15)
BUN SERPL-MCNC: 20 MG/DL (ref 8–23)
BUN/CREAT SERPL: 20.8 (ref 7–25)
CALCIUM SPEC-SCNC: 10.2 MG/DL (ref 8.6–10.5)
CHLORIDE SERPL-SCNC: 101 MMOL/L (ref 98–107)
CO2 SERPL-SCNC: 26 MMOL/L (ref 22–29)
CREAT SERPL-MCNC: 0.96 MG/DL (ref 0.76–1.27)
DEPRECATED RDW RBC AUTO: 42.8 FL (ref 37–54)
EGFRCR SERPLBLD CKD-EPI 2021: 86.1 ML/MIN/1.73
ERYTHROCYTE [DISTWIDTH] IN BLOOD BY AUTOMATED COUNT: 13 % (ref 12.3–15.4)
GLUCOSE SERPL-MCNC: 90 MG/DL (ref 65–99)
HBA1C MFR BLD: 5.1 % (ref 4.8–5.6)
HCT VFR BLD AUTO: 39.8 % (ref 37.5–51)
HGB BLD-MCNC: 13 G/DL (ref 13–17.7)
INR PPP: 1.1 (ref 0.89–1.12)
MCH RBC QN AUTO: 29.5 PG (ref 26.6–33)
MCHC RBC AUTO-ENTMCNC: 32.7 G/DL (ref 31.5–35.7)
MCV RBC AUTO: 90.5 FL (ref 79–97)
PLATELET # BLD AUTO: 378 10*3/MM3 (ref 140–450)
PMV BLD AUTO: 8.7 FL (ref 6–12)
POTASSIUM SERPL-SCNC: 4.9 MMOL/L (ref 3.5–5.2)
PROTHROMBIN TIME: 14.4 SECONDS (ref 12.2–14.5)
RBC # BLD AUTO: 4.4 10*6/MM3 (ref 4.14–5.8)
SODIUM SERPL-SCNC: 138 MMOL/L (ref 136–145)
WBC NRBC COR # BLD AUTO: 6.35 10*3/MM3 (ref 3.4–10.8)

## 2024-09-03 PROCEDURE — 36415 COLL VENOUS BLD VENIPUNCTURE: CPT

## 2024-09-03 PROCEDURE — 83036 HEMOGLOBIN GLYCOSYLATED A1C: CPT

## 2024-09-03 PROCEDURE — 80048 BASIC METABOLIC PNL TOTAL CA: CPT

## 2024-09-03 PROCEDURE — 85610 PROTHROMBIN TIME: CPT

## 2024-09-03 PROCEDURE — 71046 X-RAY EXAM CHEST 2 VIEWS: CPT

## 2024-09-03 PROCEDURE — 85027 COMPLETE CBC AUTOMATED: CPT

## 2024-09-03 RX ORDER — APIXABAN 5 MG/1
1 TABLET, FILM COATED ORAL 2 TIMES DAILY
Status: ON HOLD | COMMUNITY
Start: 2024-03-26

## 2024-09-03 RX ORDER — HYDROCODONE BITARTRATE AND ACETAMINOPHEN 5; 325 MG/1; MG/1
2 TABLET ORAL EVERY 6 HOURS PRN
Status: ON HOLD | COMMUNITY
Start: 2024-03-19

## 2024-09-03 RX ORDER — AMLODIPINE BESYLATE 5 MG/1
1 TABLET ORAL DAILY
Status: ON HOLD | COMMUNITY

## 2024-09-03 RX ORDER — LANOLIN ALCOHOL/MO/W.PET/CERES
50 CREAM (GRAM) TOPICAL DAILY
Status: ON HOLD | COMMUNITY

## 2024-09-03 RX ORDER — CHLORAL HYDRATE 500 MG
2000 CAPSULE ORAL
Status: ON HOLD | COMMUNITY

## 2024-09-03 RX ORDER — FAMOTIDINE 10 MG
20 TABLET ORAL DAILY PRN
Status: ON HOLD | COMMUNITY

## 2024-09-03 RX ORDER — BISOPROLOL FUMARATE 5 MG/1
1 TABLET, FILM COATED ORAL DAILY
Status: ON HOLD | COMMUNITY
Start: 2024-07-24

## 2024-09-03 RX ORDER — FOLIC ACID 1 MG/1
1 TABLET ORAL DAILY
Status: ON HOLD | COMMUNITY

## 2024-09-03 RX ORDER — ATORVASTATIN CALCIUM 80 MG/1
0.5 TABLET, FILM COATED ORAL DAILY
Status: ON HOLD | COMMUNITY

## 2024-09-03 RX ORDER — LOSARTAN POTASSIUM 100 MG/1
1 TABLET ORAL DAILY
Status: ON HOLD | COMMUNITY
Start: 2024-07-29

## 2024-09-03 RX ORDER — GABAPENTIN 300 MG/1
300 CAPSULE ORAL EVERY EVENING
Status: ON HOLD | COMMUNITY

## 2024-09-03 RX ORDER — LORATADINE 10 MG/1
10 CAPSULE, LIQUID FILLED ORAL DAILY
Status: ON HOLD | COMMUNITY

## 2024-09-03 NOTE — DISCHARGE INSTRUCTIONS
The following information and instructions were given:    Do not eat, drink, smoke or chew gum after midnight the night before surgery. This includes no mints.  Take all routine, prescribed medications including heart and blood pressure medicines with a sip of water unless otherwise instructed by your physician.   Do NOT take diabetic medication unless instructed by your physician.      DO NOT shave for two days before your procedure.  Do not wear makeup.      DO NOT wear fingernail polish (gel/regular) and/or acrylic/artificial nails on the day of surgery. If you had a recent manicure and would rather not remove polish or artificial nails, the minimum requirement is that the polish/artificial nails must be removed from the middle finger on each hand.      If you are having surgery/procedure on an upper extremity, fingernail polish/artificial fingernails must be removed for surgery.  NO EXCEPTIONS.      If you are having surgery/procedure on a lower extremity, toenail polish on both extremities must be removed for surgery.  NO EXCEPTIONS.    Remove all jewelry (advise to go to jeweler if unable to remove).  Jewelry, especially rings, can no longer be taped for surgery.    Leave anything you consider valuable at home.    Leave your suitcase in the car until after your surgery if you are staying overnight.    Bring the following with you the day of your procedure (when applicable):       -Picture ID and insurance cards       -Co-pay/deductible required by insurance       -Medications in the original bottles or a detailed list (name, dosage, frequency of medications) including all over-the-counter medications if not brought to PAT       -Copy of advance directive, living will or power of  documents if not brought to PAT       -CPAP or BIPAP mask and tubing (do not bring machine)       -Skin prep instruction(s) sheet       -PAT Pass    Educational handout or binder (joint replacements) related to procedure given  to patient.  Educational handout also includes general information related to the recovery that mentions signs and symptoms of infection and when to call the doctor.    When applicable, an ERAS handout was given to patient.    Respirex use and pneumonia prevention education provided in Pre Admission Testing general education video.    Information related to infection and hand hygiene mentioned in Pre Admission Testing general education video. Patient instructed to call their doctor if any of the following symptoms are noted during recovery:  Fever of 100.4 F or higher, incision that is warm or has increasing bleeding, redness or drainage.    DVT Prevention instructions given in general education video presentation during Pre Admission Testing appointment that stress the importance of ambulation to improve blood circulation.  Also encouraged patient to perform foot exercises when in bed and application of a sequential device may be applied to lower extremities to improve circulation.      Please apply Benzoyl Peroxide 5% wash to surgical area and document date/time of applications on skin prep instruction sheet.

## 2024-09-03 NOTE — PAT
An arrival time for procedure was not provided during PAT visit. If patient had any questions or concerns about their arrival time, they were instructed to contact their surgeon/physician.  Additionally, if the patient referred to an arrival time that was acquired from their my chart account, patient was encouraged to verify that time with their surgeon/physician. Arrival times are NOT provided in Pre Admission Testing Department.     Patient's surgeon called in a prescription for Benzol Peroxide 5% wash to [facility].  Patient instructed to  from [facility] that was submitted electronically.  Verbal and written instructions given regarding proper use of the Benzoyl Peroxide wash were provided to patient and/or famlily during PAT visit. Patient/family also instructed to complete Benzol Peroxide checklist and return it to Pre-op on the day of surgery.  Patient and/or family verbalized understanding.      Additionally, reinforced with patient to acquire this prescription from the [facility] retail pharmacy before leaving the hospital after PAT visit due to the potential unavailability at local pharmacies.    Patient instructed to drink 20 ounces of Gatorade or Gatorlyte (if diabetic) and it needs to be completed 1 hour (for Main OR patients) or 2 hours (scheduled  section & [facility] patients) before given arrival time for procedure (NO RED Gatorade and NO Gatorade Zero).    Patient verbalized understanding.    Per Anesthesia Request, patient instructed not to take their ACE/ARB medications on the AM of surgery.    Patient directed to Radiology Department for CXR after Pre Admission Testing Appointment.      Cardiac clearance and EKG in chart, and patient stated he was aware to hold Eliquis prior to surgery.

## 2024-09-15 ENCOUNTER — ANESTHESIA EVENT (OUTPATIENT)
Dept: PERIOP | Facility: HOSPITAL | Age: 69
End: 2024-09-15
Payer: MEDICARE

## 2024-09-15 RX ORDER — SODIUM CHLORIDE 9 MG/ML
40 INJECTION, SOLUTION INTRAVENOUS AS NEEDED
Status: CANCELLED | OUTPATIENT
Start: 2024-09-15

## 2024-09-15 RX ORDER — SODIUM CHLORIDE 0.9 % (FLUSH) 0.9 %
10 SYRINGE (ML) INJECTION EVERY 12 HOURS SCHEDULED
Status: CANCELLED | OUTPATIENT
Start: 2024-09-15

## 2024-09-15 RX ORDER — SODIUM CHLORIDE 0.9 % (FLUSH) 0.9 %
10 SYRINGE (ML) INJECTION AS NEEDED
Status: CANCELLED | OUTPATIENT
Start: 2024-09-15

## 2024-09-15 RX ORDER — FAMOTIDINE 10 MG/ML
20 INJECTION, SOLUTION INTRAVENOUS ONCE
Status: CANCELLED | OUTPATIENT
Start: 2024-09-15 | End: 2024-09-15

## 2024-09-16 ENCOUNTER — ANESTHESIA EVENT CONVERTED (OUTPATIENT)
Dept: ANESTHESIOLOGY | Facility: HOSPITAL | Age: 69
End: 2024-09-16
Payer: MEDICARE

## 2024-09-16 ENCOUNTER — ANESTHESIA (OUTPATIENT)
Dept: PERIOP | Facility: HOSPITAL | Age: 69
End: 2024-09-16
Payer: MEDICARE

## 2024-09-16 ENCOUNTER — APPOINTMENT (OUTPATIENT)
Dept: GENERAL RADIOLOGY | Facility: HOSPITAL | Age: 69
End: 2024-09-16
Payer: MEDICARE

## 2024-09-16 ENCOUNTER — HOSPITAL ENCOUNTER (OUTPATIENT)
Facility: HOSPITAL | Age: 69
Discharge: HOME | End: 2024-09-17
Attending: ORTHOPAEDIC SURGERY | Admitting: ORTHOPAEDIC SURGERY
Payer: MEDICARE

## 2024-09-16 PROBLEM — Z96.611 STATUS POST REVERSE TOTAL REPLACEMENT OF RIGHT SHOULDER: Status: ACTIVE | Noted: 2024-09-16

## 2024-09-16 PROBLEM — I48.91 ATRIAL FIBRILLATION: Status: ACTIVE | Noted: 2024-09-16

## 2024-09-16 PROBLEM — E78.5 HYPERLIPIDEMIA: Status: ACTIVE | Noted: 2024-09-16

## 2024-09-16 PROBLEM — Z79.01 CHRONIC ANTICOAGULATION: Status: ACTIVE | Noted: 2024-09-16

## 2024-09-16 PROBLEM — I10 HTN (HYPERTENSION): Status: ACTIVE | Noted: 2024-09-16

## 2024-09-16 LAB
GLUCOSE BLDC GLUCOMTR-MCNC: 88 MG/DL (ref 70–130)
POTASSIUM SERPL-SCNC: 4.5 MMOL/L (ref 3.5–5.2)

## 2024-09-16 PROCEDURE — A9270 NON-COVERED ITEM OR SERVICE: HCPCS | Performed by: ORTHOPAEDIC SURGERY

## 2024-09-16 PROCEDURE — 63710000001 CETIRIZINE 10 MG TABLET: Performed by: INTERNAL MEDICINE

## 2024-09-16 PROCEDURE — 82948 REAGENT STRIP/BLOOD GLUCOSE: CPT

## 2024-09-16 PROCEDURE — 25010000002 CEFAZOLIN PER 500 MG: Performed by: ORTHOPAEDIC SURGERY

## 2024-09-16 PROCEDURE — C1776 JOINT DEVICE (IMPLANTABLE): HCPCS | Performed by: ORTHOPAEDIC SURGERY

## 2024-09-16 PROCEDURE — 97110 THERAPEUTIC EXERCISES: CPT | Performed by: OCCUPATIONAL THERAPIST

## 2024-09-16 PROCEDURE — 25010000002 ONDANSETRON PER 1 MG: Performed by: ANESTHESIOLOGY

## 2024-09-16 PROCEDURE — 25010000002 PHENYLEPHRINE 10 MG/ML SOLUTION 1 ML VIAL

## 2024-09-16 PROCEDURE — 25010000002 ROPIVACAINE HCL-NACL 0.2-0.9 % SOLUTION: Performed by: NURSE ANESTHETIST, CERTIFIED REGISTERED

## 2024-09-16 PROCEDURE — 84132 ASSAY OF SERUM POTASSIUM: CPT | Performed by: ORTHOPAEDIC SURGERY

## 2024-09-16 PROCEDURE — 25010000002 DEXAMETHASONE PER 1 MG

## 2024-09-16 PROCEDURE — 25810000003 LACTATED RINGERS PER 1000 ML: Performed by: ANESTHESIOLOGY

## 2024-09-16 PROCEDURE — A9270 NON-COVERED ITEM OR SERVICE: HCPCS | Performed by: INTERNAL MEDICINE

## 2024-09-16 PROCEDURE — 25010000002 BUPIVACAINE (PF) 0.25 % SOLUTION: Performed by: NURSE ANESTHETIST, CERTIFIED REGISTERED

## 2024-09-16 PROCEDURE — C1713 ANCHOR/SCREW BN/BN,TIS/BN: HCPCS | Performed by: ORTHOPAEDIC SURGERY

## 2024-09-16 PROCEDURE — 25010000002 PROPOFOL 10 MG/ML EMULSION

## 2024-09-16 PROCEDURE — 25010000002 VANCOMYCIN HCL 1.25 G RECONSTITUTED SOLUTION 1 EACH VIAL: Performed by: ORTHOPAEDIC SURGERY

## 2024-09-16 PROCEDURE — G0378 HOSPITAL OBSERVATION PER HR: HCPCS

## 2024-09-16 PROCEDURE — 63710000001 AMLODIPINE 5 MG TABLET: Performed by: INTERNAL MEDICINE

## 2024-09-16 PROCEDURE — 97550 CAREGIVER TRAING 1ST 30 MIN: CPT | Performed by: OCCUPATIONAL THERAPIST

## 2024-09-16 PROCEDURE — 63710000001 OXYCODONE 5 MG TABLET: Performed by: ORTHOPAEDIC SURGERY

## 2024-09-16 PROCEDURE — 25010000002 ESMOLOL 100 MG/10ML SOLUTION

## 2024-09-16 PROCEDURE — 97530 THERAPEUTIC ACTIVITIES: CPT | Performed by: OCCUPATIONAL THERAPIST

## 2024-09-16 PROCEDURE — 25810000003 SODIUM CHLORIDE 0.9 % SOLUTION 250 ML FLEX CONT

## 2024-09-16 PROCEDURE — 97535 SELF CARE MNGMENT TRAINING: CPT | Performed by: OCCUPATIONAL THERAPIST

## 2024-09-16 PROCEDURE — L3670 SO ACRO/CLAV CAN WEB PRE OTS: HCPCS | Performed by: ORTHOPAEDIC SURGERY

## 2024-09-16 PROCEDURE — 73030 X-RAY EXAM OF SHOULDER: CPT

## 2024-09-16 PROCEDURE — 25010000002 FENTANYL CITRATE (PF) 50 MCG/ML SOLUTION: Performed by: NURSE ANESTHETIST, CERTIFIED REGISTERED

## 2024-09-16 PROCEDURE — 63710000001 GABAPENTIN 300 MG CAPSULE: Performed by: INTERNAL MEDICINE

## 2024-09-16 PROCEDURE — 63710000001 ACETAMINOPHEN 325 MG TABLET: Performed by: ORTHOPAEDIC SURGERY

## 2024-09-16 PROCEDURE — 25010000002 SUGAMMADEX 200 MG/2ML SOLUTION: Performed by: ANESTHESIOLOGY

## 2024-09-16 PROCEDURE — 63710000001 LOSARTAN 50 MG TABLET: Performed by: INTERNAL MEDICINE

## 2024-09-16 PROCEDURE — 63710000001 ATORVASTATIN 40 MG TABLET: Performed by: INTERNAL MEDICINE

## 2024-09-16 PROCEDURE — 97165 OT EVAL LOW COMPLEX 30 MIN: CPT | Performed by: OCCUPATIONAL THERAPIST

## 2024-09-16 PROCEDURE — 25810000003 SODIUM CHLORIDE 0.9 % SOLUTION 250 ML FLEX CONT: Performed by: ORTHOPAEDIC SURGERY

## 2024-09-16 DEVICE — IMPLANTABLE DEVICE
Type: IMPLANTABLE DEVICE | Site: SHOULDER | Status: FUNCTIONAL
Brand: EQUINOXE

## 2024-09-16 DEVICE — IMPLANTABLE DEVICE: Type: IMPLANTABLE DEVICE | Site: SHOULDER | Status: FUNCTIONAL

## 2024-09-16 DEVICE — HUMERAL LINER, CONSTRAINED
Type: IMPLANTABLE DEVICE | Site: SHOULDER | Status: FUNCTIONAL
Brand: EQUINOXE®

## 2024-09-16 DEVICE — FW,BPB #2 SUTR,BLU W/NDL
Type: IMPLANTABLE DEVICE | Site: SHOULDER | Status: FUNCTIONAL
Brand: ARTHREX®

## 2024-09-16 RX ORDER — FAMOTIDINE 20 MG/1
20 TABLET, FILM COATED ORAL ONCE
Status: COMPLETED | OUTPATIENT
Start: 2024-09-16 | End: 2024-09-16

## 2024-09-16 RX ORDER — ATORVASTATIN CALCIUM 40 MG/1
40 TABLET, FILM COATED ORAL DAILY
Status: DISCONTINUED | OUTPATIENT
Start: 2024-09-16 | End: 2024-09-17 | Stop reason: HOSPADM

## 2024-09-16 RX ORDER — LOSARTAN POTASSIUM 50 MG/1
100 TABLET ORAL DAILY
Status: DISCONTINUED | OUTPATIENT
Start: 2024-09-16 | End: 2024-09-17 | Stop reason: HOSPADM

## 2024-09-16 RX ORDER — ASPIRIN 81 MG/1
81 TABLET ORAL EVERY 12 HOURS SCHEDULED
Status: DISCONTINUED | OUTPATIENT
Start: 2024-09-17 | End: 2024-09-17 | Stop reason: HOSPADM

## 2024-09-16 RX ORDER — BUPIVACAINE HYDROCHLORIDE 2.5 MG/ML
INJECTION, SOLUTION EPIDURAL; INFILTRATION; INTRACAUDAL
Status: COMPLETED | OUTPATIENT
Start: 2024-09-16 | End: 2024-09-16

## 2024-09-16 RX ORDER — EPHEDRINE SULFATE 50 MG/ML
INJECTION INTRAVENOUS AS NEEDED
Status: DISCONTINUED | OUTPATIENT
Start: 2024-09-16 | End: 2024-09-16 | Stop reason: SURG

## 2024-09-16 RX ORDER — SODIUM CHLORIDE 450 MG/100ML
50 INJECTION, SOLUTION INTRAVENOUS CONTINUOUS
Status: DISCONTINUED | OUTPATIENT
Start: 2024-09-16 | End: 2024-09-17 | Stop reason: HOSPADM

## 2024-09-16 RX ORDER — LABETALOL HYDROCHLORIDE 5 MG/ML
10 INJECTION, SOLUTION INTRAVENOUS EVERY 4 HOURS PRN
Status: DISCONTINUED | OUTPATIENT
Start: 2024-09-16 | End: 2024-09-17 | Stop reason: HOSPADM

## 2024-09-16 RX ORDER — MIDAZOLAM HYDROCHLORIDE 1 MG/ML
0.5 INJECTION INTRAMUSCULAR; INTRAVENOUS
Status: DISCONTINUED | OUTPATIENT
Start: 2024-09-16 | End: 2024-09-16 | Stop reason: HOSPADM

## 2024-09-16 RX ORDER — PROPOFOL 10 MG/ML
VIAL (ML) INTRAVENOUS AS NEEDED
Status: DISCONTINUED | OUTPATIENT
Start: 2024-09-16 | End: 2024-09-16 | Stop reason: SURG

## 2024-09-16 RX ORDER — ONDANSETRON 2 MG/ML
4 INJECTION INTRAMUSCULAR; INTRAVENOUS EVERY 6 HOURS PRN
Status: DISCONTINUED | OUTPATIENT
Start: 2024-09-16 | End: 2024-09-17 | Stop reason: HOSPADM

## 2024-09-16 RX ORDER — DEXAMETHASONE SODIUM PHOSPHATE 4 MG/ML
INJECTION, SOLUTION INTRA-ARTICULAR; INTRALESIONAL; INTRAMUSCULAR; INTRAVENOUS; SOFT TISSUE AS NEEDED
Status: DISCONTINUED | OUTPATIENT
Start: 2024-09-16 | End: 2024-09-16 | Stop reason: SURG

## 2024-09-16 RX ORDER — DROPERIDOL 2.5 MG/ML
0.62 INJECTION, SOLUTION INTRAMUSCULAR; INTRAVENOUS ONCE AS NEEDED
Status: DISCONTINUED | OUTPATIENT
Start: 2024-09-16 | End: 2024-09-16 | Stop reason: HOSPADM

## 2024-09-16 RX ORDER — CETIRIZINE HYDROCHLORIDE 10 MG/1
10 TABLET ORAL DAILY
Status: DISCONTINUED | OUTPATIENT
Start: 2024-09-16 | End: 2024-09-17 | Stop reason: HOSPADM

## 2024-09-16 RX ORDER — NALOXONE HCL 0.4 MG/ML
0.1 VIAL (ML) INJECTION
Status: DISCONTINUED | OUTPATIENT
Start: 2024-09-16 | End: 2024-09-17 | Stop reason: HOSPADM

## 2024-09-16 RX ORDER — LIDOCAINE HYDROCHLORIDE 10 MG/ML
INJECTION, SOLUTION EPIDURAL; INFILTRATION; INTRACAUDAL; PERINEURAL AS NEEDED
Status: DISCONTINUED | OUTPATIENT
Start: 2024-09-16 | End: 2024-09-16 | Stop reason: SURG

## 2024-09-16 RX ORDER — SODIUM CHLORIDE, SODIUM LACTATE, POTASSIUM CHLORIDE, CALCIUM CHLORIDE 600; 310; 30; 20 MG/100ML; MG/100ML; MG/100ML; MG/100ML
9 INJECTION, SOLUTION INTRAVENOUS CONTINUOUS
Status: DISCONTINUED | OUTPATIENT
Start: 2024-09-16 | End: 2024-09-17 | Stop reason: HOSPADM

## 2024-09-16 RX ORDER — ROPIVACAINE HYDROCHLORIDE 2 MG/ML
INJECTION, SOLUTION EPIDURAL; INFILTRATION; PERINEURAL CONTINUOUS
Status: DISCONTINUED | OUTPATIENT
Start: 2024-09-16 | End: 2024-09-17 | Stop reason: HOSPADM

## 2024-09-16 RX ORDER — FENTANYL CITRATE 50 UG/ML
INJECTION, SOLUTION INTRAMUSCULAR; INTRAVENOUS
Status: COMPLETED | OUTPATIENT
Start: 2024-09-16 | End: 2024-09-16

## 2024-09-16 RX ORDER — TRANEXAMIC ACID 10 MG/ML
1000 INJECTION, SOLUTION INTRAVENOUS ONCE
Status: COMPLETED | OUTPATIENT
Start: 2024-09-16 | End: 2024-09-16

## 2024-09-16 RX ORDER — ROCURONIUM BROMIDE 10 MG/ML
INJECTION, SOLUTION INTRAVENOUS AS NEEDED
Status: DISCONTINUED | OUTPATIENT
Start: 2024-09-16 | End: 2024-09-16 | Stop reason: SURG

## 2024-09-16 RX ORDER — GABAPENTIN 300 MG/1
300 CAPSULE ORAL EVERY EVENING
Status: DISCONTINUED | OUTPATIENT
Start: 2024-09-16 | End: 2024-09-17 | Stop reason: HOSPADM

## 2024-09-16 RX ORDER — ONDANSETRON 4 MG/1
4 TABLET, ORALLY DISINTEGRATING ORAL EVERY 6 HOURS PRN
Status: DISCONTINUED | OUTPATIENT
Start: 2024-09-16 | End: 2024-09-17 | Stop reason: HOSPADM

## 2024-09-16 RX ORDER — AMLODIPINE BESYLATE 5 MG/1
5 TABLET ORAL DAILY
Status: DISCONTINUED | OUTPATIENT
Start: 2024-09-16 | End: 2024-09-17 | Stop reason: HOSPADM

## 2024-09-16 RX ORDER — ONDANSETRON 2 MG/ML
INJECTION INTRAMUSCULAR; INTRAVENOUS AS NEEDED
Status: DISCONTINUED | OUTPATIENT
Start: 2024-09-16 | End: 2024-09-16 | Stop reason: SURG

## 2024-09-16 RX ORDER — ESMOLOL HYDROCHLORIDE 10 MG/ML
INJECTION INTRAVENOUS AS NEEDED
Status: DISCONTINUED | OUTPATIENT
Start: 2024-09-16 | End: 2024-09-16 | Stop reason: SURG

## 2024-09-16 RX ORDER — OXYCODONE HYDROCHLORIDE 5 MG/1
5 TABLET ORAL EVERY 4 HOURS PRN
Status: DISCONTINUED | OUTPATIENT
Start: 2024-09-16 | End: 2024-09-17 | Stop reason: HOSPADM

## 2024-09-16 RX ORDER — ACETAMINOPHEN 325 MG/1
650 TABLET ORAL EVERY 4 HOURS PRN
Status: DISCONTINUED | OUTPATIENT
Start: 2024-09-16 | End: 2024-09-17 | Stop reason: HOSPADM

## 2024-09-16 RX ORDER — FAMOTIDINE 20 MG/1
20 TABLET, FILM COATED ORAL DAILY PRN
Status: DISCONTINUED | OUTPATIENT
Start: 2024-09-16 | End: 2024-09-17 | Stop reason: HOSPADM

## 2024-09-16 RX ORDER — HYDROMORPHONE HYDROCHLORIDE 1 MG/ML
0.5 INJECTION, SOLUTION INTRAMUSCULAR; INTRAVENOUS; SUBCUTANEOUS
Status: DISCONTINUED | OUTPATIENT
Start: 2024-09-16 | End: 2024-09-16 | Stop reason: HOSPADM

## 2024-09-16 RX ORDER — ACETAMINOPHEN 500 MG
1000 TABLET ORAL ONCE
Status: COMPLETED | OUTPATIENT
Start: 2024-09-16 | End: 2024-09-16

## 2024-09-16 RX ORDER — LIDOCAINE HYDROCHLORIDE 10 MG/ML
0.5 INJECTION, SOLUTION EPIDURAL; INFILTRATION; INTRACAUDAL; PERINEURAL ONCE AS NEEDED
Status: COMPLETED | OUTPATIENT
Start: 2024-09-16 | End: 2024-09-16

## 2024-09-16 RX ORDER — FENTANYL CITRATE 50 UG/ML
50 INJECTION, SOLUTION INTRAMUSCULAR; INTRAVENOUS
Status: DISCONTINUED | OUTPATIENT
Start: 2024-09-16 | End: 2024-09-16 | Stop reason: HOSPADM

## 2024-09-16 RX ORDER — BISOPROLOL FUMARATE 5 MG/1
5 TABLET, FILM COATED ORAL DAILY
Status: DISCONTINUED | OUTPATIENT
Start: 2024-09-17 | End: 2024-09-17 | Stop reason: HOSPADM

## 2024-09-16 RX ADMIN — DEXAMETHASONE SODIUM PHOSPHATE 4 MG: 4 INJECTION INTRA-ARTICULAR; INTRALESIONAL; INTRAMUSCULAR; INTRAVENOUS; SOFT TISSUE at 10:16

## 2024-09-16 RX ADMIN — LIDOCAINE HYDROCHLORIDE 0.5 ML: 10 INJECTION, SOLUTION EPIDURAL; INFILTRATION; INTRACAUDAL; PERINEURAL at 08:39

## 2024-09-16 RX ADMIN — EPHEDRINE SULFATE 5 MG: 50 INJECTION INTRAVENOUS at 10:42

## 2024-09-16 RX ADMIN — PROPOFOL 25 MCG/KG/MIN: 10 INJECTION, EMULSION INTRAVENOUS at 10:15

## 2024-09-16 RX ADMIN — LIDOCAINE HYDROCHLORIDE 80 MG: 10 INJECTION, SOLUTION EPIDURAL; INFILTRATION; INTRACAUDAL; PERINEURAL at 10:12

## 2024-09-16 RX ADMIN — FAMOTIDINE 20 MG: 20 TABLET, FILM COATED ORAL at 08:39

## 2024-09-16 RX ADMIN — AMLODIPINE BESYLATE 5 MG: 5 TABLET ORAL at 18:03

## 2024-09-16 RX ADMIN — PROPOFOL 150 MG: 10 INJECTION, EMULSION INTRAVENOUS at 10:12

## 2024-09-16 RX ADMIN — ATORVASTATIN CALCIUM 40 MG: 40 TABLET, FILM COATED ORAL at 20:20

## 2024-09-16 RX ADMIN — LOSARTAN POTASSIUM 100 MG: 50 TABLET, FILM COATED ORAL at 18:01

## 2024-09-16 RX ADMIN — ESMOLOL HYDROCHLORIDE 60 MG: 100 INJECTION, SOLUTION INTRAVENOUS at 10:12

## 2024-09-16 RX ADMIN — ACETAMINOPHEN 1000 MG: 500 TABLET ORAL at 08:39

## 2024-09-16 RX ADMIN — ROCURONIUM BROMIDE 20 MG: 10 INJECTION INTRAVENOUS at 10:48

## 2024-09-16 RX ADMIN — VANCOMYCIN HYDROCHLORIDE 1250 MG: 1.25 INJECTION, POWDER, LYOPHILIZED, FOR SOLUTION INTRAVENOUS at 09:45

## 2024-09-16 RX ADMIN — ONDANSETRON 4 MG: 2 INJECTION INTRAMUSCULAR; INTRAVENOUS at 11:46

## 2024-09-16 RX ADMIN — SODIUM CHLORIDE 2000 MG: 900 INJECTION INTRAVENOUS at 08:44

## 2024-09-16 RX ADMIN — ROCURONIUM BROMIDE 70 MG: 10 INJECTION INTRAVENOUS at 10:12

## 2024-09-16 RX ADMIN — BUPIVACAINE HYDROCHLORIDE 30 ML: 2.5 INJECTION, SOLUTION EPIDURAL; INFILTRATION; INTRACAUDAL; PERINEURAL at 09:25

## 2024-09-16 RX ADMIN — EPHEDRINE SULFATE 5 MG: 50 INJECTION INTRAVENOUS at 10:48

## 2024-09-16 RX ADMIN — FENTANYL CITRATE 100 MCG: 50 INJECTION, SOLUTION INTRAMUSCULAR; INTRAVENOUS at 09:26

## 2024-09-16 RX ADMIN — VANCOMYCIN HYDROCHLORIDE 1250 MG: 1.25 INJECTION, POWDER, LYOPHILIZED, FOR SOLUTION INTRAVENOUS at 20:20

## 2024-09-16 RX ADMIN — BUPIVACAINE HYDROCHLORIDE 15 ML: 2.5 INJECTION, SOLUTION EPIDURAL; INFILTRATION; INTRACAUDAL at 09:26

## 2024-09-16 RX ADMIN — OXYCODONE HYDROCHLORIDE 5 MG: 5 TABLET ORAL at 20:20

## 2024-09-16 RX ADMIN — TRANEXAMIC ACID 1000 MG: 10 INJECTION, SOLUTION INTRAVENOUS at 11:47

## 2024-09-16 RX ADMIN — ACETAMINOPHEN 650 MG: 325 TABLET ORAL at 16:11

## 2024-09-16 RX ADMIN — TRANEXAMIC ACID 1000 MG: 10 INJECTION, SOLUTION INTRAVENOUS at 10:24

## 2024-09-16 RX ADMIN — OXYCODONE HYDROCHLORIDE 5 MG: 5 TABLET ORAL at 16:11

## 2024-09-16 RX ADMIN — PHENYLEPHRINE HYDROCHLORIDE 0.2 MCG/KG/MIN: 10 INJECTION INTRAVENOUS at 10:17

## 2024-09-16 RX ADMIN — SODIUM CHLORIDE, POTASSIUM CHLORIDE, SODIUM LACTATE AND CALCIUM CHLORIDE 9 ML/HR: 600; 310; 30; 20 INJECTION, SOLUTION INTRAVENOUS at 08:39

## 2024-09-16 RX ADMIN — GABAPENTIN 300 MG: 300 CAPSULE ORAL at 20:20

## 2024-09-16 RX ADMIN — SUGAMMADEX 200 MG: 100 INJECTION, SOLUTION INTRAVENOUS at 11:53

## 2024-09-16 RX ADMIN — SODIUM CHLORIDE 50 ML/HR: 4.5 INJECTION, SOLUTION INTRAVENOUS at 17:54

## 2024-09-16 RX ADMIN — Medication 1000 MG: at 12:22

## 2024-09-16 RX ADMIN — EPHEDRINE SULFATE 5 MG: 50 INJECTION INTRAVENOUS at 11:12

## 2024-09-16 RX ADMIN — CETIRIZINE HYDROCHLORIDE 10 MG: 10 TABLET, FILM COATED ORAL at 18:04

## 2024-09-17 VITALS
WEIGHT: 166 LBS | OXYGEN SATURATION: 98 % | RESPIRATION RATE: 18 BRPM | TEMPERATURE: 98.3 F | HEIGHT: 65 IN | BODY MASS INDEX: 27.66 KG/M2 | SYSTOLIC BLOOD PRESSURE: 130 MMHG | HEART RATE: 65 BPM | DIASTOLIC BLOOD PRESSURE: 81 MMHG

## 2024-09-17 PROBLEM — Z96.611 S/P REVERSE TOTAL SHOULDER ARTHROPLASTY, RIGHT: Status: ACTIVE | Noted: 2024-09-17

## 2024-09-17 LAB
ANION GAP SERPL CALCULATED.3IONS-SCNC: 10 MMOL/L (ref 5–15)
BASOPHILS # BLD AUTO: 0.01 10*3/MM3 (ref 0–0.2)
BASOPHILS NFR BLD AUTO: 0.1 % (ref 0–1.5)
BUN SERPL-MCNC: 13 MG/DL (ref 8–23)
BUN/CREAT SERPL: 20 (ref 7–25)
CALCIUM SPEC-SCNC: 7.6 MG/DL (ref 8.6–10.5)
CHLORIDE SERPL-SCNC: 100 MMOL/L (ref 98–107)
CO2 SERPL-SCNC: 21 MMOL/L (ref 22–29)
CREAT SERPL-MCNC: 0.65 MG/DL (ref 0.76–1.27)
DEPRECATED RDW RBC AUTO: 44.3 FL (ref 37–54)
EGFRCR SERPLBLD CKD-EPI 2021: 102.6 ML/MIN/1.73
EOSINOPHIL # BLD AUTO: 0 10*3/MM3 (ref 0–0.4)
EOSINOPHIL NFR BLD AUTO: 0 % (ref 0.3–6.2)
ERYTHROCYTE [DISTWIDTH] IN BLOOD BY AUTOMATED COUNT: 13.1 % (ref 12.3–15.4)
GLUCOSE SERPL-MCNC: 114 MG/DL (ref 65–99)
HCT VFR BLD AUTO: 33.6 % (ref 37.5–51)
HGB BLD-MCNC: 10.5 G/DL (ref 13–17.7)
IMM GRANULOCYTES # BLD AUTO: 0.03 10*3/MM3 (ref 0–0.05)
IMM GRANULOCYTES NFR BLD AUTO: 0.3 % (ref 0–0.5)
LYMPHOCYTES # BLD AUTO: 0.96 10*3/MM3 (ref 0.7–3.1)
LYMPHOCYTES NFR BLD AUTO: 8.9 % (ref 19.6–45.3)
MCH RBC QN AUTO: 29.2 PG (ref 26.6–33)
MCHC RBC AUTO-ENTMCNC: 31.3 G/DL (ref 31.5–35.7)
MCV RBC AUTO: 93.3 FL (ref 79–97)
MONOCYTES # BLD AUTO: 0.84 10*3/MM3 (ref 0.1–0.9)
MONOCYTES NFR BLD AUTO: 7.8 % (ref 5–12)
NEUTROPHILS NFR BLD AUTO: 8.89 10*3/MM3 (ref 1.7–7)
NEUTROPHILS NFR BLD AUTO: 82.9 % (ref 42.7–76)
NRBC BLD AUTO-RTO: 0 /100 WBC (ref 0–0.2)
PLATELET # BLD AUTO: 208 10*3/MM3 (ref 140–450)
PMV BLD AUTO: 9.6 FL (ref 6–12)
POTASSIUM SERPL-SCNC: 3.9 MMOL/L (ref 3.5–5.2)
RBC # BLD AUTO: 3.6 10*6/MM3 (ref 4.14–5.8)
SODIUM SERPL-SCNC: 131 MMOL/L (ref 136–145)
WBC NRBC COR # BLD AUTO: 10.73 10*3/MM3 (ref 3.4–10.8)

## 2024-09-17 PROCEDURE — A9270 NON-COVERED ITEM OR SERVICE: HCPCS | Performed by: ORTHOPAEDIC SURGERY

## 2024-09-17 PROCEDURE — 63710000001 ACETAMINOPHEN 325 MG TABLET: Performed by: ORTHOPAEDIC SURGERY

## 2024-09-17 PROCEDURE — 63710000001 BISOPROLOL 5 MG TABLET: Performed by: INTERNAL MEDICINE

## 2024-09-17 PROCEDURE — 97116 GAIT TRAINING THERAPY: CPT

## 2024-09-17 PROCEDURE — 85025 COMPLETE CBC W/AUTO DIFF WBC: CPT | Performed by: ORTHOPAEDIC SURGERY

## 2024-09-17 PROCEDURE — A9270 NON-COVERED ITEM OR SERVICE: HCPCS | Performed by: INTERNAL MEDICINE

## 2024-09-17 PROCEDURE — 63710000001 AMLODIPINE 5 MG TABLET: Performed by: INTERNAL MEDICINE

## 2024-09-17 PROCEDURE — 63710000001 CETIRIZINE 10 MG TABLET: Performed by: INTERNAL MEDICINE

## 2024-09-17 PROCEDURE — 63710000001 ASPIRIN 81 MG TABLET DELAYED-RELEASE: Performed by: ORTHOPAEDIC SURGERY

## 2024-09-17 PROCEDURE — 63710000001 LOSARTAN 50 MG TABLET: Performed by: INTERNAL MEDICINE

## 2024-09-17 PROCEDURE — 97535 SELF CARE MNGMENT TRAINING: CPT | Performed by: OCCUPATIONAL THERAPIST

## 2024-09-17 PROCEDURE — 97161 PT EVAL LOW COMPLEX 20 MIN: CPT

## 2024-09-17 PROCEDURE — 63710000001 OXYCODONE 5 MG TABLET: Performed by: ORTHOPAEDIC SURGERY

## 2024-09-17 PROCEDURE — 97110 THERAPEUTIC EXERCISES: CPT | Performed by: OCCUPATIONAL THERAPIST

## 2024-09-17 PROCEDURE — 80048 BASIC METABOLIC PNL TOTAL CA: CPT | Performed by: ORTHOPAEDIC SURGERY

## 2024-09-17 RX ORDER — APIXABAN 5 MG/1
5 TABLET, FILM COATED ORAL 2 TIMES DAILY
Start: 2024-09-17

## 2024-09-17 RX ORDER — OXYCODONE HYDROCHLORIDE 5 MG/1
5 TABLET ORAL EVERY 4 HOURS PRN
Qty: 25 TABLET | Refills: 0 | Status: SHIPPED | OUTPATIENT
Start: 2024-09-17 | End: 2024-09-26

## 2024-09-17 RX ORDER — ROPIVACAINE HYDROCHLORIDE 2 MG/ML
1 INJECTION, SOLUTION EPIDURAL; INFILTRATION; PERINEURAL CONTINUOUS
Start: 2024-09-17

## 2024-09-17 RX ADMIN — LOSARTAN POTASSIUM 100 MG: 50 TABLET, FILM COATED ORAL at 08:10

## 2024-09-17 RX ADMIN — OXYCODONE HYDROCHLORIDE 5 MG: 5 TABLET ORAL at 00:34

## 2024-09-17 RX ADMIN — CETIRIZINE HYDROCHLORIDE 10 MG: 10 TABLET, FILM COATED ORAL at 08:10

## 2024-09-17 RX ADMIN — OXYCODONE HYDROCHLORIDE 5 MG: 5 TABLET ORAL at 06:31

## 2024-09-17 RX ADMIN — ACETAMINOPHEN 650 MG: 325 TABLET ORAL at 08:11

## 2024-09-17 RX ADMIN — OXYCODONE HYDROCHLORIDE 5 MG: 5 TABLET ORAL at 10:13

## 2024-09-17 RX ADMIN — BISOPROLOL FUMARATE 5 MG: 5 TABLET ORAL at 08:10

## 2024-09-17 RX ADMIN — ASPIRIN 81 MG: 81 TABLET, COATED ORAL at 08:10

## 2024-09-17 RX ADMIN — AMLODIPINE BESYLATE 5 MG: 5 TABLET ORAL at 08:10

## 2024-11-11 RX ORDER — FOLIC ACID 1 MG/1
1000 TABLET ORAL DAILY
Qty: 30 TABLET | Refills: 4 | OUTPATIENT
Start: 2024-11-11

## 2024-11-14 ENCOUNTER — HOSPITAL ENCOUNTER (OUTPATIENT)
Dept: HOSPITAL 22 - RT | Age: 69
Discharge: HOME | End: 2024-11-14
Payer: MEDICARE

## 2024-11-14 DIAGNOSIS — I65.23: Primary | ICD-10-CM

## 2024-11-14 PROCEDURE — 93880 EXTRACRANIAL BILAT STUDY: CPT

## 2024-12-27 ENCOUNTER — HOSPITAL ENCOUNTER (OUTPATIENT)
Dept: HOSPITAL 22 - OT | Age: 69
Discharge: HOME | End: 2024-12-27
Payer: MEDICARE

## 2024-12-27 DIAGNOSIS — Z96.611: ICD-10-CM

## 2024-12-27 DIAGNOSIS — M25.511: Primary | ICD-10-CM

## 2024-12-27 PROCEDURE — 97166 OT EVAL MOD COMPLEX 45 MIN: CPT

## 2024-12-27 PROCEDURE — G0283 ELEC STIM OTHER THAN WOUND: HCPCS

## 2024-12-27 PROCEDURE — 97110 THERAPEUTIC EXERCISES: CPT

## 2024-12-27 PROCEDURE — 97014 ELECTRIC STIMULATION THERAPY: CPT

## 2024-12-27 PROCEDURE — 97140 MANUAL THERAPY 1/> REGIONS: CPT

## 2024-12-31 ENCOUNTER — HOSPITAL ENCOUNTER (OUTPATIENT)
Dept: HOSPITAL 22 - PT | Age: 69
Discharge: HOME | End: 2024-12-31
Payer: MEDICARE

## 2024-12-31 DIAGNOSIS — M54.41: Primary | ICD-10-CM

## 2024-12-31 PROCEDURE — 97110 THERAPEUTIC EXERCISES: CPT

## 2024-12-31 PROCEDURE — 97163 PT EVAL HIGH COMPLEX 45 MIN: CPT

## 2025-02-10 RX ORDER — METHOTREXATE 2.5 MG/1
TABLET ORAL
Qty: 32 TABLET | Refills: 4 | OUTPATIENT
Start: 2025-02-10

## 2025-02-20 ENCOUNTER — HOSPITAL ENCOUNTER (OUTPATIENT)
Dept: HOSPITAL 22 - OT | Age: 70
Discharge: HOME | End: 2025-02-20
Payer: MEDICARE

## 2025-02-20 DIAGNOSIS — Z98.890: ICD-10-CM

## 2025-02-20 DIAGNOSIS — Z96.611: Primary | ICD-10-CM

## 2025-02-20 PROCEDURE — 97014 ELECTRIC STIMULATION THERAPY: CPT

## 2025-02-20 PROCEDURE — 97110 THERAPEUTIC EXERCISES: CPT

## 2025-02-20 PROCEDURE — 97140 MANUAL THERAPY 1/> REGIONS: CPT

## 2025-02-20 PROCEDURE — G0283 ELEC STIM OTHER THAN WOUND: HCPCS

## 2025-02-27 ENCOUNTER — HOSPITAL ENCOUNTER (OUTPATIENT)
Dept: HOSPITAL 22 - PT | Age: 70
Discharge: HOME | End: 2025-02-27
Payer: MEDICARE

## 2025-02-27 DIAGNOSIS — M54.41: Primary | ICD-10-CM

## 2025-02-27 PROCEDURE — 97014 ELECTRIC STIMULATION THERAPY: CPT

## 2025-02-27 PROCEDURE — G0283 ELEC STIM OTHER THAN WOUND: HCPCS

## 2025-02-27 PROCEDURE — 97110 THERAPEUTIC EXERCISES: CPT

## 2025-03-04 ENCOUNTER — HOSPITAL ENCOUNTER (OUTPATIENT)
Dept: HOSPITAL 22 - PT | Age: 70
Discharge: HOME | End: 2025-03-04
Payer: MEDICARE

## 2025-03-04 DIAGNOSIS — M54.41: Primary | ICD-10-CM

## 2025-03-04 PROCEDURE — 97014 ELECTRIC STIMULATION THERAPY: CPT

## 2025-03-04 PROCEDURE — G0283 ELEC STIM OTHER THAN WOUND: HCPCS

## 2025-03-04 PROCEDURE — 97110 THERAPEUTIC EXERCISES: CPT

## 2025-03-12 ENCOUNTER — HOSPITAL ENCOUNTER (EMERGENCY)
Age: 70
Discharge: HOME | End: 2025-03-12
Payer: MEDICARE

## 2025-03-12 VITALS — OXYGEN SATURATION: 100 % | HEART RATE: 66 BPM | RESPIRATION RATE: 11 BRPM

## 2025-03-12 VITALS
DIASTOLIC BLOOD PRESSURE: 65 MMHG | TEMPERATURE: 97.88 F | SYSTOLIC BLOOD PRESSURE: 140 MMHG | RESPIRATION RATE: 14 BRPM | HEART RATE: 78 BPM

## 2025-03-12 VITALS — RESPIRATION RATE: 13 BRPM

## 2025-03-12 VITALS — SYSTOLIC BLOOD PRESSURE: 163 MMHG | DIASTOLIC BLOOD PRESSURE: 59 MMHG | RESPIRATION RATE: 11 BRPM

## 2025-03-12 VITALS — HEART RATE: 80 BPM | OXYGEN SATURATION: 98 %

## 2025-03-12 VITALS
DIASTOLIC BLOOD PRESSURE: 69 MMHG | TEMPERATURE: 98.5 F | OXYGEN SATURATION: 97 % | SYSTOLIC BLOOD PRESSURE: 163 MMHG | RESPIRATION RATE: 14 BRPM | HEART RATE: 75 BPM

## 2025-03-12 VITALS — BODY MASS INDEX: 28.3 KG/M2

## 2025-03-12 VITALS — SYSTOLIC BLOOD PRESSURE: 163 MMHG | DIASTOLIC BLOOD PRESSURE: 69 MMHG | HEART RATE: 76 BPM | OXYGEN SATURATION: 96 %

## 2025-03-12 VITALS
HEART RATE: 86 BPM | RESPIRATION RATE: 13 BRPM | OXYGEN SATURATION: 97 % | DIASTOLIC BLOOD PRESSURE: 64 MMHG | SYSTOLIC BLOOD PRESSURE: 160 MMHG

## 2025-03-12 VITALS — DIASTOLIC BLOOD PRESSURE: 64 MMHG | RESPIRATION RATE: 12 BRPM | SYSTOLIC BLOOD PRESSURE: 160 MMHG

## 2025-03-12 VITALS
RESPIRATION RATE: 13 BRPM | SYSTOLIC BLOOD PRESSURE: 158 MMHG | DIASTOLIC BLOOD PRESSURE: 58 MMHG | OXYGEN SATURATION: 97 %

## 2025-03-12 VITALS — RESPIRATION RATE: 12 BRPM

## 2025-03-12 DIAGNOSIS — R07.89: Primary | ICD-10-CM

## 2025-03-12 LAB
ALBUMIN LEVEL: 4.6 G/DL (ref 3.5–5)
ALBUMIN/GLOB SERPL: 1.8 {RATIO} (ref 1.1–1.8)
ALP ISO SERPL-ACNC: 121 U/L (ref 38–126)
ALT SERPLBLD-CCNC: 37 U/L (ref 12–78)
ANION GAP SERPL CALC-SCNC: 15.3 MEQ/L (ref 5–15)
AST SERPL QL: 42 U/L (ref 17–59)
BILIRUBIN,TOTAL: 0.3 MG/DL (ref 0.2–1.3)
BUN SERPL-MCNC: 22 MG/DL (ref 9–20)
CALCIUM SPEC-MCNC: 9.6 MG/DL (ref 8.4–10.2)
CHLORIDE SPEC-SCNC: 104 MMOL/L (ref 98–107)
CO2 SERPL-SCNC: 24 MMOL/L (ref 22–30)
CREAT BLD-SCNC: 0.9 MG/DL (ref 0.66–1.25)
CREATININE CLEARANCE ESTIMATED: 76 ML/MIN (ref 50–200)
ESTIMATED GLOMERULAR FILT RATE: 84 ML/MIN (ref 60–?)
GFR (AFRICAN AMERICAN): 101 ML/MIN (ref 60–?)
GLOBULIN SER CALC-MCNC: 2.5 G/DL (ref 1.3–3.2)
GLUCOSE: 151 MG/DL (ref 74–100)
HCT VFR BLD CALC: 40.5 % (ref 42–52)
HGB BLD-MCNC: 13.4 G/DL (ref 14.1–18)
MCHC RBC-ENTMCNC: 33.1 G/DL (ref 31.8–35.4)
MCV RBC: 87.5 FL (ref 80–94)
MEAN CORPUSCULAR HEMOGLOBIN: 28.9 PG (ref 27–31.2)
PLATELET # BLD: 291 K/MM3 (ref 142–424)
POTASSIUM: 4.3 MMOL/L (ref 3.5–5.1)
PROT SERPL-MCNC: 7.1 G/DL (ref 6.3–8.2)
RBC # BLD AUTO: 4.63 M/MM3 (ref 4.6–6.2)
SODIUM SPEC-SCNC: 139 MMOL/L (ref 136–145)
TROPONIN I: < 0.01 NG/ML (ref 0–0.03)
WBC # BLD AUTO: 8.9 K/MM3 (ref 4.8–10.8)

## 2025-03-12 PROCEDURE — 99284 EMERGENCY DEPT VISIT MOD MDM: CPT

## 2025-03-12 PROCEDURE — 71046 X-RAY EXAM CHEST 2 VIEWS: CPT

## 2025-03-12 PROCEDURE — 87389 HIV-1 AG W/HIV-1&-2 AB AG IA: CPT

## 2025-03-12 PROCEDURE — 93005 ELECTROCARDIOGRAM TRACING: CPT

## 2025-03-12 PROCEDURE — 84484 ASSAY OF TROPONIN QUANT: CPT

## 2025-03-12 PROCEDURE — 86803 HEPATITIS C AB TEST: CPT

## 2025-03-12 PROCEDURE — 85025 COMPLETE CBC W/AUTO DIFF WBC: CPT

## 2025-03-12 PROCEDURE — 80053 COMPREHEN METABOLIC PANEL: CPT

## 2025-04-01 ENCOUNTER — HOSPITAL ENCOUNTER (OUTPATIENT)
Dept: HOSPITAL 22 - RAD | Age: 70
Discharge: HOME | End: 2025-04-01
Payer: MEDICARE

## 2025-04-01 DIAGNOSIS — Z87.891: Primary | ICD-10-CM

## 2025-04-01 PROCEDURE — 71271 CT THORAX LUNG CANCER SCR C-: CPT

## (undated) DEVICE — PENCL SMOKE/EVAC MEGADYNE TELESCP 10FT

## (undated) DEVICE — DRAPE,SHOULDER,BEACH CHAIR,STERILE: Brand: MEDLINE

## (undated) DEVICE — PATIENT RETURN ELECTRODE, SINGLE-USE, CONTACT QUALITY MONITORING, ADULT, WITH 9FT CORD, FOR PATIENTS WEIGING OVER 33LBS. (15KG): Brand: MEGADYNE

## (undated) DEVICE — CEMENT MIXING SYSTEM WITH MIS FEMORAL BREAKAWAY NOZZLE: Brand: REVOLUTION

## (undated) DEVICE — SKN PREP SPNG STKS PVP PNT STR: Brand: MEDLINE INDUSTRIES, INC.

## (undated) DEVICE — UNDERGLV SURG BIOGEL INDICAT PI SZ8 BLU

## (undated) DEVICE — KT PIN HEX SHLDR CORACOID EXACTECHGPS DISP

## (undated) DEVICE — SYR LUERLOK 30CC

## (undated) DEVICE — SLNG ULTRSLING2 11TO13IN MD

## (undated) DEVICE — ELECTRD BLD EZ CLN STD 6.5IN

## (undated) DEVICE — PK MAJ SHLDR SPLT 10

## (undated) DEVICE — HANDPIECE SET WITH HIGH FLOW TIP AND SUCTION TUBE: Brand: INTERPULSE

## (undated) DEVICE — THIN FLEXIBLE NOZZLE: Brand: REVOLUTION

## (undated) DEVICE — STRYKER PERFORMANCE SERIES SAGITTAL BLADE: Brand: STRYKER PERFORMANCE SERIES

## (undated) DEVICE — GLV SURG SENSICARE PI ORTHO SZ7.5 LF STRL

## (undated) DEVICE — KT PUMP INFUBLOCK MDL 2100 PMKITSOLIS

## (undated) DEVICE — GOWN,NON-REINFORCED,SIRUS,SET IN SLV,XL: Brand: MEDLINE

## (undated) DEVICE — POSTN HD UNIV

## (undated) DEVICE — PKNG ABS BONE ORTHODRI 8IN

## (undated) DEVICE — BLANKT WARM LOWR/BDY 100X120CM

## (undated) DEVICE — KT SHLDR EXACTECHGPS DISP